# Patient Record
Sex: MALE | Race: BLACK OR AFRICAN AMERICAN | NOT HISPANIC OR LATINO
[De-identification: names, ages, dates, MRNs, and addresses within clinical notes are randomized per-mention and may not be internally consistent; named-entity substitution may affect disease eponyms.]

---

## 2017-06-23 ENCOUNTER — APPOINTMENT (OUTPATIENT)
Dept: ORTHOPEDIC SURGERY | Facility: CLINIC | Age: 47
End: 2017-06-23

## 2017-06-23 VITALS — HEIGHT: 68 IN | WEIGHT: 218 LBS | BODY MASS INDEX: 33.04 KG/M2

## 2017-06-23 DIAGNOSIS — Z78.9 OTHER SPECIFIED HEALTH STATUS: ICD-10-CM

## 2017-06-23 DIAGNOSIS — M67.911 UNSPECIFIED DISORDER OF SYNOVIUM AND TENDON, RIGHT SHOULDER: ICD-10-CM

## 2017-07-12 ENCOUNTER — APPOINTMENT (OUTPATIENT)
Dept: ORTHOPEDIC SURGERY | Facility: CLINIC | Age: 47
End: 2017-07-12

## 2017-08-08 ENCOUNTER — APPOINTMENT (OUTPATIENT)
Dept: HEART AND VASCULAR | Facility: CLINIC | Age: 47
End: 2017-08-08

## 2017-08-08 VITALS
OXYGEN SATURATION: 98 % | TEMPERATURE: 98.6 F | BODY MASS INDEX: 32.89 KG/M2 | DIASTOLIC BLOOD PRESSURE: 86 MMHG | HEART RATE: 61 BPM | RESPIRATION RATE: 12 BRPM | HEIGHT: 68 IN | WEIGHT: 217 LBS | SYSTOLIC BLOOD PRESSURE: 120 MMHG

## 2017-08-10 ENCOUNTER — APPOINTMENT (OUTPATIENT)
Dept: SURGERY | Facility: CLINIC | Age: 47
End: 2017-08-10

## 2017-08-10 ENCOUNTER — OUTPATIENT (OUTPATIENT)
Dept: OUTPATIENT SERVICES | Facility: HOSPITAL | Age: 47
LOS: 1 days | End: 2017-08-10
Payer: COMMERCIAL

## 2017-08-10 DIAGNOSIS — Z01.818 ENCOUNTER FOR OTHER PREPROCEDURAL EXAMINATION: ICD-10-CM

## 2017-08-10 DIAGNOSIS — M75.121 COMPLETE ROTATOR CUFF TEAR OR RUPTURE OF RIGHT SHOULDER, NOT SPECIFIED AS TRAUMATIC: ICD-10-CM

## 2017-08-10 DIAGNOSIS — M67.921 UNSPECIFIED DISORDER OF SYNOVIUM AND TENDON, RIGHT UPPER ARM: ICD-10-CM

## 2017-08-10 LAB
ANION GAP SERPL CALC-SCNC: 12 MMOL/L — SIGNIFICANT CHANGE UP (ref 5–17)
APTT BLD: 31.9 SEC — SIGNIFICANT CHANGE UP (ref 27.5–37.4)
BUN SERPL-MCNC: 15 MG/DL — SIGNIFICANT CHANGE UP (ref 7–23)
CALCIUM SERPL-MCNC: 9.2 MG/DL — SIGNIFICANT CHANGE UP (ref 8.4–10.5)
CHLORIDE SERPL-SCNC: 98 MMOL/L — SIGNIFICANT CHANGE UP (ref 96–108)
CO2 SERPL-SCNC: 25 MMOL/L — SIGNIFICANT CHANGE UP (ref 22–31)
CREAT SERPL-MCNC: 1.2 MG/DL — SIGNIFICANT CHANGE UP (ref 0.5–1.3)
GLUCOSE SERPL-MCNC: 88 MG/DL — SIGNIFICANT CHANGE UP (ref 70–99)
HCT VFR BLD CALC: 41.9 % — SIGNIFICANT CHANGE UP (ref 39–50)
HGB BLD-MCNC: 14.7 G/DL — SIGNIFICANT CHANGE UP (ref 13–17)
INR BLD: 1.03 — SIGNIFICANT CHANGE UP (ref 0.88–1.16)
MCHC RBC-ENTMCNC: 30.8 PG — SIGNIFICANT CHANGE UP (ref 27–34)
MCHC RBC-ENTMCNC: 35.1 G/DL — SIGNIFICANT CHANGE UP (ref 32–36)
MCV RBC AUTO: 87.7 FL — SIGNIFICANT CHANGE UP (ref 80–100)
PLATELET # BLD AUTO: 262 K/UL — SIGNIFICANT CHANGE UP (ref 150–400)
POTASSIUM SERPL-MCNC: 4 MMOL/L — SIGNIFICANT CHANGE UP (ref 3.5–5.3)
POTASSIUM SERPL-SCNC: 4 MMOL/L — SIGNIFICANT CHANGE UP (ref 3.5–5.3)
PROTHROM AB SERPL-ACNC: 11.4 SEC — SIGNIFICANT CHANGE UP (ref 9.8–12.7)
RBC # BLD: 4.78 M/UL — SIGNIFICANT CHANGE UP (ref 4.2–5.8)
RBC # FLD: 12.5 % — SIGNIFICANT CHANGE UP (ref 10.3–16.9)
SODIUM SERPL-SCNC: 135 MMOL/L — SIGNIFICANT CHANGE UP (ref 135–145)
WBC # BLD: 5.3 K/UL — SIGNIFICANT CHANGE UP (ref 3.8–10.5)
WBC # FLD AUTO: 5.3 K/UL — SIGNIFICANT CHANGE UP (ref 3.8–10.5)

## 2017-08-10 PROCEDURE — 93010 ELECTROCARDIOGRAM REPORT: CPT

## 2017-08-17 ENCOUNTER — APPOINTMENT (OUTPATIENT)
Dept: ORTHOPEDIC SURGERY | Facility: AMBULATORY SURGERY CENTER | Age: 47
End: 2017-08-17

## 2017-08-17 ENCOUNTER — OUTPATIENT (OUTPATIENT)
Dept: OUTPATIENT SERVICES | Facility: HOSPITAL | Age: 47
LOS: 1 days | Discharge: HOME | End: 2017-08-17
Payer: COMMERCIAL

## 2017-08-17 PROCEDURE — 29827 SHO ARTHRS SRG RT8TR CUF RPR: CPT | Mod: RT
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
## 2017-08-25 DIAGNOSIS — E66.9 OBESITY, UNSPECIFIED: ICD-10-CM

## 2017-08-25 DIAGNOSIS — Z87.442 PERSONAL HISTORY OF URINARY CALCULI: ICD-10-CM

## 2017-08-25 DIAGNOSIS — S46.011A STRAIN OF MUSCLE(S) AND TENDON(S) OF THE ROTATOR CUFF OF RIGHT SHOULDER, INITIAL ENCOUNTER: ICD-10-CM
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
## 2017-08-25 DIAGNOSIS — Z87.891 PERSONAL HISTORY OF NICOTINE DEPENDENCE: ICD-10-CM

## 2017-08-29 ENCOUNTER — APPOINTMENT (OUTPATIENT)
Dept: ORTHOPEDIC SURGERY | Facility: CLINIC | Age: 47
End: 2017-08-29
Payer: COMMERCIAL

## 2017-08-29 PROCEDURE — 99024 POSTOP FOLLOW-UP VISIT: CPT

## 2017-09-18 ENCOUNTER — APPOINTMENT (OUTPATIENT)
Dept: ORTHOPEDIC SURGERY | Facility: CLINIC | Age: 47
End: 2017-09-18
Payer: COMMERCIAL

## 2017-09-18 PROCEDURE — 99024 POSTOP FOLLOW-UP VISIT: CPT

## 2017-10-25 ENCOUNTER — APPOINTMENT (OUTPATIENT)
Dept: ORTHOPEDIC SURGERY | Facility: CLINIC | Age: 47
End: 2017-10-25

## 2017-11-15 ENCOUNTER — APPOINTMENT (OUTPATIENT)
Dept: ORTHOPEDIC SURGERY | Facility: CLINIC | Age: 47
End: 2017-11-15
Payer: COMMERCIAL

## 2017-11-15 PROCEDURE — 99024 POSTOP FOLLOW-UP VISIT: CPT

## 2017-12-20 ENCOUNTER — APPOINTMENT (OUTPATIENT)
Dept: ORTHOPEDIC SURGERY | Facility: CLINIC | Age: 47
End: 2017-12-20
Payer: COMMERCIAL

## 2017-12-20 PROCEDURE — 99214 OFFICE O/P EST MOD 30 MIN: CPT

## 2018-01-08 ENCOUNTER — APPOINTMENT (OUTPATIENT)
Dept: ORTHOPEDIC SURGERY | Facility: CLINIC | Age: 48
End: 2018-01-08
Payer: COMMERCIAL

## 2018-01-08 PROCEDURE — 99214 OFFICE O/P EST MOD 30 MIN: CPT

## 2018-04-02 ENCOUNTER — APPOINTMENT (OUTPATIENT)
Dept: ORTHOPEDIC SURGERY | Facility: CLINIC | Age: 48
End: 2018-04-02
Payer: COMMERCIAL

## 2018-04-02 PROCEDURE — 99213 OFFICE O/P EST LOW 20 MIN: CPT

## 2018-07-24 ENCOUNTER — EMERGENCY (EMERGENCY)
Facility: HOSPITAL | Age: 48
LOS: 1 days | Discharge: HOME | End: 2018-07-24
Attending: EMERGENCY MEDICINE | Admitting: INTERNAL MEDICINE
Payer: COMMERCIAL

## 2018-07-24 VITALS
OXYGEN SATURATION: 96 % | TEMPERATURE: 98 F | DIASTOLIC BLOOD PRESSURE: 91 MMHG | HEART RATE: 66 BPM | SYSTOLIC BLOOD PRESSURE: 150 MMHG | RESPIRATION RATE: 20 BRPM

## 2018-07-24 VITALS
TEMPERATURE: 98 F | HEIGHT: 68 IN | OXYGEN SATURATION: 98 % | DIASTOLIC BLOOD PRESSURE: 89 MMHG | HEART RATE: 64 BPM | SYSTOLIC BLOOD PRESSURE: 140 MMHG | WEIGHT: 226.19 LBS | RESPIRATION RATE: 20 BRPM

## 2018-07-24 DIAGNOSIS — R42 DIZZINESS AND GIDDINESS: ICD-10-CM

## 2018-07-24 DIAGNOSIS — R50.9 FEVER, UNSPECIFIED: ICD-10-CM

## 2018-07-24 PROCEDURE — 70450 CT HEAD/BRAIN W/O DYE: CPT | Mod: 26

## 2018-07-24 PROCEDURE — 36415 COLL VENOUS BLD VENIPUNCTURE: CPT

## 2018-07-24 PROCEDURE — 99284 EMERGENCY DEPT VISIT MOD MDM: CPT | Mod: 25

## 2018-07-24 PROCEDURE — 85730 THROMBOPLASTIN TIME PARTIAL: CPT

## 2018-07-24 PROCEDURE — 82962 GLUCOSE BLOOD TEST: CPT

## 2018-07-24 PROCEDURE — 93010 ELECTROCARDIOGRAM REPORT: CPT

## 2018-07-24 PROCEDURE — 70450 CT HEAD/BRAIN W/O DYE: CPT

## 2018-07-24 PROCEDURE — 80053 COMPREHEN METABOLIC PANEL: CPT

## 2018-07-24 PROCEDURE — 85025 COMPLETE CBC W/AUTO DIFF WBC: CPT

## 2018-07-24 PROCEDURE — 85610 PROTHROMBIN TIME: CPT

## 2018-07-24 PROCEDURE — 93005 ELECTROCARDIOGRAM TRACING: CPT

## 2018-07-24 RX ORDER — DIAZEPAM 5 MG
1 TABLET ORAL
Qty: 15 | Refills: 0 | OUTPATIENT
Start: 2018-07-24

## 2018-07-24 RX ORDER — SODIUM CHLORIDE 9 MG/ML
1000 INJECTION INTRAMUSCULAR; INTRAVENOUS; SUBCUTANEOUS ONCE
Qty: 0 | Refills: 0 | Status: COMPLETED | OUTPATIENT
Start: 2018-07-24 | End: 2018-07-24

## 2018-07-24 RX ORDER — MECLIZINE HCL 12.5 MG
25 TABLET ORAL ONCE
Qty: 0 | Refills: 0 | Status: COMPLETED | OUTPATIENT
Start: 2018-07-24 | End: 2018-07-24

## 2018-07-24 RX ORDER — DIAZEPAM 5 MG
5 TABLET ORAL ONCE
Qty: 0 | Refills: 0 | Status: DISCONTINUED | OUTPATIENT
Start: 2018-07-24 | End: 2018-07-24

## 2018-07-24 RX ORDER — MECLIZINE HCL 12.5 MG
1 TABLET ORAL
Qty: 20 | Refills: 0 | OUTPATIENT
Start: 2018-07-24

## 2018-07-24 RX ADMIN — Medication 5 MILLIGRAM(S): at 15:10

## 2018-07-24 RX ADMIN — Medication 25 MILLIGRAM(S): at 15:10

## 2018-07-24 RX ADMIN — SODIUM CHLORIDE 2000 MILLILITER(S): 9 INJECTION INTRAMUSCULAR; INTRAVENOUS; SUBCUTANEOUS at 15:11

## 2018-07-24 NOTE — ED PROVIDER NOTE - CARE PLAN
Principal Discharge DX:	Pyelonephritis  Secondary Diagnosis:	Fever Principal Discharge DX:	Vertigo  Secondary Diagnosis:	Fever Principal Discharge DX:	Vertigo

## 2018-07-24 NOTE — ED PROVIDER NOTE - MEDICAL DECISION MAKING DETAILS
47 yo male, with hx of vertigo (dxed several years ago), p/w dizziness and sensation of "room spinning" and unsteady gait, worsening since yesterday morning. Mild diffuse HA earlier, which has now resolved. Sxs are similar to prior episode of vertigo except sxs have lasted a lot longer. Pt took some OTC "motion sickness" medication yesterday (unknown name) with no improvement. States sxs worsen everytime he moves his head. Went to an Urgent Care today and was sent to ED. Pt denies focal weakness/ paresthesias/ speech changes/ vision changes/Cp/SOB/ recent illness/ fevers/ chills/N/V. Pt feeling much better post IVF, Meclizine and Valium. Resting comfortably. Ambulating normally. No ataxia. Rxs given. To f/up outpt. ENT f/up given.

## 2018-07-24 NOTE — ED ADULT NURSE NOTE - OBJECTIVE STATEMENT
Pt came to ED complaining of dizziness and "room spinning" x 30 hours. Pt reports vertigo in past.  PT denies D/N/V, fever, chills, chest pain or SOB.  Pt states he woke up with symptoms yesterday, took OTC medication with no relief. Pt is alert and oriented x3.  Denies all other medical complaints at this time. Positive PMS x4 extremities, no facial droop, neg results on [employer] Stroke scale.

## 2018-07-24 NOTE — ED ADULT NURSE NOTE - CHPI ED SYMPTOMS NEG
no nausea/no chills/no pain/no numbness/no tingling/no fever/no weakness/no decreased eating/drinking

## 2018-07-24 NOTE — ED PROVIDER NOTE - OBJECTIVE STATEMENT
47 yo male, with hx of vertigo (dxed several years ago), p/w dizziness and sensation of "room spinning" and unsteady gait, worsening since yesterday morning. Mild diffuse HA earlier, which has now resolved. Sxs are similar to prior episode of vertigo except sxs have lasted a lot longer. Pt took some OTC "motion sickness" medication yesterday (unknown name) with no improvement. States sxs worsen everytime he moves his head. Went to an Urgent Care today and was sent to ED. Pt denies focal weakness/ paresthesias/ speech changes/ vision changes/Cp/SOB/ rcent illness/ fevers/ chills/N/V. No other complaints.

## 2018-07-24 NOTE — ED PROVIDER NOTE - PROGRESS NOTE DETAILS
post IVF, Meclizine and Valium. Resting comfortably. Ambulating normally. No ataxia. Rxs given. To f/up outpt. ENT f/up given.

## 2018-07-24 NOTE — ED ADULT TRIAGE NOTE - CHIEF COMPLAINT QUOTE
Patient c/o dizziness ( feels room spinning and unsteady ) , lightheadedness and headache since 5am  yesterday . Was sent from urgent care for evaluation .

## 2018-10-25 ENCOUNTER — EMERGENCY (EMERGENCY)
Facility: HOSPITAL | Age: 48
LOS: 1 days | Discharge: HOME | End: 2018-10-25
Attending: EMERGENCY MEDICINE | Admitting: EMERGENCY MEDICINE
Payer: COMMERCIAL

## 2018-10-25 VITALS
RESPIRATION RATE: 18 BRPM | HEART RATE: 51 BPM | OXYGEN SATURATION: 98 % | DIASTOLIC BLOOD PRESSURE: 86 MMHG | TEMPERATURE: 99 F | SYSTOLIC BLOOD PRESSURE: 136 MMHG

## 2018-10-25 VITALS
WEIGHT: 225.09 LBS | RESPIRATION RATE: 17 BRPM | SYSTOLIC BLOOD PRESSURE: 154 MMHG | DIASTOLIC BLOOD PRESSURE: 94 MMHG | HEART RATE: 66 BPM | TEMPERATURE: 98 F

## 2018-10-25 DIAGNOSIS — I10 ESSENTIAL (PRIMARY) HYPERTENSION: ICD-10-CM

## 2018-10-25 DIAGNOSIS — R42 DIZZINESS AND GIDDINESS: ICD-10-CM

## 2018-10-25 DIAGNOSIS — Z79.899 OTHER LONG TERM (CURRENT) DRUG THERAPY: ICD-10-CM

## 2018-10-25 LAB
ALBUMIN SERPL ELPH-MCNC: 4.3 G/DL — SIGNIFICANT CHANGE UP (ref 3.3–5)
ALP SERPL-CCNC: 62 U/L — SIGNIFICANT CHANGE UP (ref 40–120)
ALT FLD-CCNC: SIGNIFICANT CHANGE UP U/L (ref 10–45)
ANION GAP SERPL CALC-SCNC: 12 MMOL/L — SIGNIFICANT CHANGE UP (ref 5–17)
AST SERPL-CCNC: SIGNIFICANT CHANGE UP U/L (ref 10–40)
BASOPHILS NFR BLD AUTO: 0.5 % — SIGNIFICANT CHANGE UP (ref 0–2)
BILIRUB SERPL-MCNC: 0.3 MG/DL — SIGNIFICANT CHANGE UP (ref 0.2–1.2)
BUN SERPL-MCNC: 11 MG/DL — SIGNIFICANT CHANGE UP (ref 7–23)
CALCIUM SERPL-MCNC: 9.4 MG/DL — SIGNIFICANT CHANGE UP (ref 8.4–10.5)
CHLORIDE SERPL-SCNC: 100 MMOL/L — SIGNIFICANT CHANGE UP (ref 96–108)
CK MB CFR SERPL CALC: 3.9 NG/ML — SIGNIFICANT CHANGE UP (ref 0–6.7)
CK SERPL-CCNC: SIGNIFICANT CHANGE UP U/L (ref 30–200)
CO2 SERPL-SCNC: 27 MMOL/L — SIGNIFICANT CHANGE UP (ref 22–31)
CREAT SERPL-MCNC: 1.09 MG/DL — SIGNIFICANT CHANGE UP (ref 0.5–1.3)
EOSINOPHIL NFR BLD AUTO: 1.6 % — SIGNIFICANT CHANGE UP (ref 0–6)
GLUCOSE SERPL-MCNC: 111 MG/DL — HIGH (ref 70–99)
HCT VFR BLD CALC: 41.9 % — SIGNIFICANT CHANGE UP (ref 39–50)
HGB BLD-MCNC: 14.7 G/DL — SIGNIFICANT CHANGE UP (ref 13–17)
LYMPHOCYTES # BLD AUTO: 52.3 % — HIGH (ref 13–44)
MCHC RBC-ENTMCNC: 30.7 PG — SIGNIFICANT CHANGE UP (ref 27–34)
MCHC RBC-ENTMCNC: 35.1 G/DL — SIGNIFICANT CHANGE UP (ref 32–36)
MCV RBC AUTO: 87.5 FL — SIGNIFICANT CHANGE UP (ref 80–100)
MONOCYTES NFR BLD AUTO: 8.6 % — SIGNIFICANT CHANGE UP (ref 2–14)
NEUTROPHILS NFR BLD AUTO: 37 % — LOW (ref 43–77)
PLATELET # BLD AUTO: 255 K/UL — SIGNIFICANT CHANGE UP (ref 150–400)
POTASSIUM SERPL-MCNC: SIGNIFICANT CHANGE UP MMOL/L (ref 3.5–5.3)
POTASSIUM SERPL-SCNC: SIGNIFICANT CHANGE UP MMOL/L (ref 3.5–5.3)
PROT SERPL-MCNC: 7.3 G/DL — SIGNIFICANT CHANGE UP (ref 6–8.3)
RBC # BLD: 4.79 M/UL — SIGNIFICANT CHANGE UP (ref 4.2–5.8)
RBC # FLD: 12.1 % — SIGNIFICANT CHANGE UP (ref 10.3–16.9)
SODIUM SERPL-SCNC: 139 MMOL/L — SIGNIFICANT CHANGE UP (ref 135–145)
TROPONIN T SERPL-MCNC: <0.01 NG/ML — SIGNIFICANT CHANGE UP (ref 0–0.01)
WBC # BLD: 4.4 K/UL — SIGNIFICANT CHANGE UP (ref 3.8–10.5)
WBC # FLD AUTO: 4.4 K/UL — SIGNIFICANT CHANGE UP (ref 3.8–10.5)

## 2018-10-25 PROCEDURE — 99284 EMERGENCY DEPT VISIT MOD MDM: CPT

## 2018-10-25 PROCEDURE — 70450 CT HEAD/BRAIN W/O DYE: CPT | Mod: 26

## 2018-10-25 PROCEDURE — 82553 CREATINE MB FRACTION: CPT

## 2018-10-25 PROCEDURE — 80053 COMPREHEN METABOLIC PANEL: CPT

## 2018-10-25 PROCEDURE — 84484 ASSAY OF TROPONIN QUANT: CPT

## 2018-10-25 PROCEDURE — 36415 COLL VENOUS BLD VENIPUNCTURE: CPT

## 2018-10-25 PROCEDURE — 85025 COMPLETE CBC W/AUTO DIFF WBC: CPT

## 2018-10-25 PROCEDURE — 82550 ASSAY OF CK (CPK): CPT

## 2018-10-25 PROCEDURE — 99284 EMERGENCY DEPT VISIT MOD MDM: CPT | Mod: 25

## 2018-10-25 PROCEDURE — 70450 CT HEAD/BRAIN W/O DYE: CPT

## 2018-10-25 RX ORDER — AMLODIPINE BESYLATE 2.5 MG/1
5 TABLET ORAL ONCE
Qty: 0 | Refills: 0 | Status: COMPLETED | OUTPATIENT
Start: 2018-10-25 | End: 2018-10-25

## 2018-10-25 RX ORDER — AMLODIPINE BESYLATE 2.5 MG/1
1 TABLET ORAL
Qty: 20 | Refills: 0 | OUTPATIENT
Start: 2018-10-25 | End: 2018-11-13

## 2018-10-25 RX ADMIN — AMLODIPINE BESYLATE 5 MILLIGRAM(S): 2.5 TABLET ORAL at 18:12

## 2018-10-25 NOTE — ED PROVIDER NOTE - OBJECTIVE STATEMENT
48 m w dizziness 48 m w dizziness- was at another facility- had blood work done a few weeks ago was neg- was dx w vertigo and given meclizine and valium  sx improve slightly with medication  no f/c no ha no n/v  sx of mild dizziness, awais when he moves his head  no falls

## 2018-10-25 NOTE — ED ADULT NURSE NOTE - NSIMPLEMENTINTERV_GEN_ALL_ED
Implemented All Universal Safety Interventions:  [place] to call system. Call bell, personal items and telephone within reach. Instruct patient to call for assistance. Room bathroom lighting operational. Non-slip footwear when patient is off stretcher. Physically safe environment: no spills, clutter or unnecessary equipment. Stretcher in lowest position, wheels locked, appropriate side rails in place.

## 2018-10-25 NOTE — ED ADULT NURSE REASSESSMENT NOTE - NS ED NURSE REASSESS COMMENT FT1
Patient US and XRay done, no new pain or symptom complaint, vital signs stable. Seen by Vascular team.  REsults and disposition pending. In CT scan, ambulated w/ steady gait.

## 2018-10-25 NOTE — ED ADULT NURSE REASSESSMENT NOTE - NS ED NURSE REASSESS COMMENT FT1
Patient a/oX 3, states dizziness resolved s/p Norvasc 5mg PO, BP improved, other vitals stable.  CT scan resulted, all labs resulted, discharged to home in stable condition.

## 2018-10-25 NOTE — ED ADULT TRIAGE NOTE - CHIEF COMPLAINT QUOTE
c/o lightheadedness when he got up in the morning 3 days ago, headache and high blood pressure.  Patient was told he has vertigo a few years ago.  Speaking clearly and coherently in full sentences.

## 2018-10-25 NOTE — ED ADULT NURSE NOTE - OBJECTIVE STATEMENT
Patient states has Hx. of vertigo and HTN, c/o of days of dizziness/lightheadedness, no headache or ringing in ears, no nausea/vomitting, denies chest pain, no SOB.  EKG NSR in ED.  No neuro deficits.

## 2018-10-25 NOTE — ED ADULT NURSE REASSESSMENT NOTE - NS ED NURSE REASSESS COMMENT FT1
Patient a/oX 3, anxious, c/o of episodes of dizziness, no chest pain or SOB, no nausea/vomitting, no neuro deficits.  Elevated BP, other vitals stable.  Right AC PIV #20 in place, all labs sent, no complications.  AMlodipine 5mg PO adminsitered.  Brought to CT scan in stable condition.

## 2018-10-25 NOTE — ED ADULT NURSE NOTE - CHPI ED NUR SYMPTOMS NEG
no decreased eating/drinking/no tingling/no fever/no chills/no vomiting/no nausea/no weakness/no pain

## 2018-11-30 ENCOUNTER — APPOINTMENT (OUTPATIENT)
Dept: HEART AND VASCULAR | Facility: CLINIC | Age: 48
End: 2018-11-30
Payer: COMMERCIAL

## 2018-11-30 VITALS
OXYGEN SATURATION: 97 % | DIASTOLIC BLOOD PRESSURE: 70 MMHG | WEIGHT: 220 LBS | SYSTOLIC BLOOD PRESSURE: 122 MMHG | HEIGHT: 68 IN | HEART RATE: 73 BPM | TEMPERATURE: 99 F | BODY MASS INDEX: 33.34 KG/M2

## 2018-11-30 DIAGNOSIS — H40.9 UNSPECIFIED GLAUCOMA: ICD-10-CM
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
## 2018-11-30 DIAGNOSIS — Z87.442 PERSONAL HISTORY OF URINARY CALCULI: ICD-10-CM

## 2018-11-30 PROCEDURE — 93000 ELECTROCARDIOGRAM COMPLETE: CPT

## 2018-11-30 PROCEDURE — 99204 OFFICE O/P NEW MOD 45 MIN: CPT

## 2018-11-30 PROCEDURE — 36415 COLL VENOUS BLD VENIPUNCTURE: CPT

## 2018-11-30 NOTE — ASSESSMENT
[FreeTextEntry1] : Health Maintainence- needs colonoscopy, PSA and GUTIERREZ 11/30/18\par \par HTN-  BP good on Amlodipine\par \par Elevated Glucose- diet reviewed, A1c sent\par \par Vertigo- CT head (-) in the ER\par \par BPH- could not reach entire gland, PSA sent.  Pt to look up name of Urologist

## 2018-11-30 NOTE — REASON FOR VISIT
[FreeTextEntry1] : 47 y/o with HTN, dizziness glucose 110.\par Seen in ER Oct 25, 2018.  BP up, CT head (-). Sugar mildly elevated in ER, diet poor\par \par EKG: NSR, normal axis and intervals, no ST-Tw abnormalities. 11/30/18

## 2018-12-02 LAB
APPEARANCE: CLEAR
BACTERIA: NEGATIVE
BILIRUBIN URINE: NEGATIVE
BLOOD URINE: ABNORMAL
CHOLEST SERPL-MCNC: 229 MG/DL
CHOLEST/HDLC SERPL: 5.5 RATIO
COLOR: YELLOW
GLUCOSE QUALITATIVE U: NEGATIVE MG/DL
HBA1C MFR BLD HPLC: 5.3 %
HDLC SERPL-MCNC: 42 MG/DL
HYALINE CASTS: 1 /LPF
KETONES URINE: NEGATIVE
LDLC SERPL CALC-MCNC: 162 MG/DL
LEUKOCYTE ESTERASE URINE: NEGATIVE
MICROSCOPIC-UA: NORMAL
NITRITE URINE: NEGATIVE
PH URINE: 5.5
PROTEIN URINE: NEGATIVE MG/DL
PSA SERPL-MCNC: 0.51 NG/ML
RED BLOOD CELLS URINE: 16 /HPF
SPECIFIC GRAVITY URINE: 1.03
SQUAMOUS EPITHELIAL CELLS: 1 /HPF
TRIGL SERPL-MCNC: 125 MG/DL
UROBILINOGEN URINE: NEGATIVE MG/DL
WHITE BLOOD CELLS URINE: 1 /HPF

## 2018-12-07 ENCOUNTER — CHART COPY (OUTPATIENT)
Age: 48
End: 2018-12-07

## 2018-12-12 ENCOUNTER — APPOINTMENT (OUTPATIENT)
Dept: ORTHOPEDIC SURGERY | Facility: CLINIC | Age: 48
End: 2018-12-12
Payer: COMMERCIAL

## 2018-12-12 PROCEDURE — 73030 X-RAY EXAM OF SHOULDER: CPT | Mod: 50

## 2018-12-12 PROCEDURE — 99214 OFFICE O/P EST MOD 30 MIN: CPT

## 2019-02-13 ENCOUNTER — APPOINTMENT (OUTPATIENT)
Dept: HEART AND VASCULAR | Facility: CLINIC | Age: 49
End: 2019-02-13
Payer: COMMERCIAL

## 2019-02-13 VITALS
OXYGEN SATURATION: 97 % | HEIGHT: 68 IN | DIASTOLIC BLOOD PRESSURE: 80 MMHG | TEMPERATURE: 98.7 F | WEIGHT: 218.99 LBS | BODY MASS INDEX: 33.19 KG/M2 | SYSTOLIC BLOOD PRESSURE: 132 MMHG | HEART RATE: 67 BPM

## 2019-02-13 DIAGNOSIS — Z82.49 FAMILY HISTORY OF ISCHEMIC HEART DISEASE AND OTHER DISEASES OF THE CIRCULATORY SYSTEM: ICD-10-CM

## 2019-02-13 PROCEDURE — 99214 OFFICE O/P EST MOD 30 MIN: CPT

## 2019-02-13 NOTE — PHYSICAL EXAM
[General Appearance - Well Developed] : well developed [Normal Appearance] : normal appearance [Well Groomed] : well groomed [General Appearance - Well Nourished] : well nourished [No Deformities] : no deformities [General Appearance - In No Acute Distress] : no acute distress [Normal Conjunctiva] : the conjunctiva exhibited no abnormalities [Eyelids - No Xanthelasma] : the eyelids demonstrated no xanthelasmas [Respiration, Rhythm And Depth] : normal respiratory rhythm and effort [Exaggerated Use Of Accessory Muscles For Inspiration] : no accessory muscle use [Auscultation Breath Sounds / Voice Sounds] : lungs were clear to auscultation bilaterally [Heart Rate And Rhythm] : heart rate and rhythm were normal [Heart Sounds] : normal S1 and S2 [Murmurs] : no murmurs present [Abdomen Soft] : soft [Abdomen Tenderness] : non-tender [Abdomen Mass (___ Cm)] : no abdominal mass palpated [Abnormal Walk] : normal gait [Gait - Sufficient For Exercise Testing] : the gait was sufficient for exercise testing [Nail Clubbing] : no clubbing of the fingernails [Cyanosis, Localized] : no localized cyanosis [Petechial Hemorrhages (___cm)] : no petechial hemorrhages [Skin Color & Pigmentation] : normal skin color and pigmentation [] : no rash [No Venous Stasis] : no venous stasis [No Skin Ulcers] : no skin ulcer [No Xanthoma] : no  xanthoma was observed [Oriented To Time, Place, And Person] : oriented to person, place, and time [Affect] : the affect was normal [Mood] : the mood was normal [No Anxiety] : not feeling anxious [FreeTextEntry1] : old burns

## 2019-02-13 NOTE — ASSESSMENT
[FreeTextEntry1] : Health Maintainence- Colonoscopy done by Dr Chanel 1/2019, PSA and GUTIERREZ 11/30/18, PSA NL but microscopic hematuria, refer to Dr Wren\par \par HTN-  BP good on Amlodipine\par \par Elevated Glucose- diet reviewed, A1c sent, 5.5\par \par HLD- , diet, wt loss reviewed\par \par Vertigo- CT head (-) in the ER\par \par BPH- could not reach entire gland, PSA sent.  Urologist is Dr Wren\par \par Health Maintainence- Colonoscopy 1/2019 with Dr Chanel, PSA and GUTIERREZ 2018, needs Urology visit

## 2019-02-13 NOTE — REASON FOR VISIT
[FreeTextEntry1] : 49 y/o with HTN, dizziness glucose 110.\par Seen in ER Oct 25, 2018.  BP up, CT head (-). Sugar mildly elevated in ER, diet poor\par \par EKG: NSR, normal axis and intervals, no ST-Tw abnormalities. 11/30/18\par \par Labs reveal  and microscopic hematuria

## 2019-04-30 ENCOUNTER — APPOINTMENT (OUTPATIENT)
Dept: ORTHOPEDIC SURGERY | Facility: CLINIC | Age: 49
End: 2019-04-30
Payer: COMMERCIAL

## 2019-04-30 VITALS
HEIGHT: 68 IN | HEART RATE: 67 BPM | BODY MASS INDEX: 33.04 KG/M2 | WEIGHT: 218 LBS | OXYGEN SATURATION: 98 % | SYSTOLIC BLOOD PRESSURE: 133 MMHG | DIASTOLIC BLOOD PRESSURE: 91 MMHG

## 2019-04-30 DIAGNOSIS — M25.512 PAIN IN LEFT SHOULDER: ICD-10-CM

## 2019-04-30 DIAGNOSIS — Z98.890 OTHER SPECIFIED POSTPROCEDURAL STATES: ICD-10-CM

## 2019-04-30 DIAGNOSIS — M25.611 STIFFNESS OF RIGHT SHOULDER, NOT ELSEWHERE CLASSIFIED: ICD-10-CM

## 2019-04-30 DIAGNOSIS — M67.921 UNSPECIFIED DISORDER OF SYNOVIUM AND TENDON, RIGHT UPPER ARM: ICD-10-CM

## 2019-04-30 DIAGNOSIS — M75.22 BICIPITAL TENDINITIS, LEFT SHOULDER: ICD-10-CM

## 2019-04-30 DIAGNOSIS — M75.121 COMPLETE ROTATOR CUFF TEAR OR RUPTURE OF RIGHT SHOULDER, NOT SPECIFIED AS TRAUMATIC: ICD-10-CM

## 2019-04-30 PROCEDURE — 99214 OFFICE O/P EST MOD 30 MIN: CPT

## 2019-04-30 RX ORDER — DIAZEPAM 5 MG/1
5 TABLET ORAL
Refills: 0 | Status: COMPLETED | COMMUNITY
End: 2019-04-30

## 2019-04-30 RX ORDER — OXYCODONE HYDROCHLORIDE AND ACETAMINOPHEN 10; 325 MG/1; MG/1
10-325 TABLET ORAL
Refills: 0 | Status: COMPLETED | COMMUNITY
End: 2019-04-30

## 2019-04-30 RX ORDER — OXYCODONE HYDROCHLORIDE AND ACETAMINOPHEN 10; 325 MG/1; MG/1
TABLET ORAL
Refills: 0 | Status: COMPLETED | COMMUNITY
End: 2019-04-30

## 2019-04-30 NOTE — PHYSICAL EXAM
[UE] : Sensory: Intact in bilateral upper extremities [Rad] : radial 2+ and symmetric bilaterally [Normal RUE] : Right Upper Extremity: No scars, rashes, lesions, ulcers, skin intact [Normal LUE] : Left Upper Extremity: No scars, rashes, lesions, ulcers, skin intact [Normal Touch] : sensation intact for touch [Normal] : Oriented to person, place, and time, insight and judgement were intact and the affect was normal [de-identified] : Bilateral shoulders\par Cervical spine is without tenderness and ROM is within normal limits and without pain.\par Normal appearance. \par Well healed portal incisions over the shoulders. No edema, erythema, ecchymoses\par AROM: 175 FE, IR to T 10 LEFT versus T12 on the RIGHT discomfort on the RIGHT equal to the , 180 abd.\par PROM: 180 FE, 45 ER at the side, 90 ER and 45 IR in the 90 degree abducted position.\par Motor:  5/5  supraspinatus,  5/5 ER, 5/5 IR, 5/5 biceps, 5/5 deltoid.  Normal lift off test\par - Neer, mildly+  Vera. -  X-arm.   - [place]'s,  mildly + Speeds.\par Tender mildly over the biceps tendon and the anterior shoulder.  \par Laxity: normal.\par No scapular winging.\par Sensation is intact distally in the UE.\par Skin is intact in the UE. \par Intact Motor distally.\par  [de-identified] : Overweight [de-identified] : no respiratory distress [de-identified] : \par No x-rays were done today. X-rays of the RIGHT shoulder had been unremarkable in December 2018. LEFT shoulder x-rays showed mild glenohumeral joint space narrowing but otherwise unremarkable

## 2019-04-30 NOTE — HISTORY OF PRESENT ILLNESS
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 He had been under the care of Dr. Savage who had done a RIGHT shoulder rotator cuff repair in 2017 and LEFT shoulder in 2014. His shoulder his head gotten stiff and more sore. He started doing physical therapy again in December 2018. He recently found a good place and it is very helpful and he would like to continue doing the therapy. He gets intermittent pain that's 3/10 that can be sharp or achy. It may be aggravated also by sleeping on his side and some pain during the day with lifting. For his work he does not typically have to lift anything over shoulder height. He occasionally takes ibuprofen 800 mg perhaps once a week for pain. No neck pain. When he gets pain is typically in the lateral shoulder region.

## 2019-04-30 NOTE — REASON FOR VISIT
[Initial Visit] : an initial visit for [Shoulder Pain] : shoulder pain [Aftercare Following Surgery] : aftercare following surgery

## 2019-04-30 NOTE — ASSESSMENT
[FreeTextEntry1] : 49-year-old gentleman status post LEFT and RIGHT rotator cuff repair in the past, the RIGHT more recently in 2017 with some stiffness and achiness in his shoulders more on the RIGHT than LEFT. His discomfort is improving with the physical therapy and I gave him a new prescription to continue with therapy now. Heat and ice and ibuprofen as needed.\par if he still is still having any pain, stiffness or issues with his shoulders he should followup in about 2 months.

## 2019-08-14 ENCOUNTER — RX RENEWAL (OUTPATIENT)
Age: 49
End: 2019-08-14

## 2019-08-30 NOTE — ED ADULT TRIAGE NOTE - BANDS:
Patient notified,Nuc Stress Test review per  she is OK for surgery. Patient verbalized understanding.       Fall Risk;

## 2019-09-27 ENCOUNTER — APPOINTMENT (OUTPATIENT)
Dept: HEART AND VASCULAR | Facility: CLINIC | Age: 49
End: 2019-09-27
Payer: COMMERCIAL

## 2019-09-27 VITALS
HEIGHT: 68 IN | BODY MASS INDEX: 33.34 KG/M2 | OXYGEN SATURATION: 98 % | WEIGHT: 220 LBS | SYSTOLIC BLOOD PRESSURE: 148 MMHG | HEART RATE: 81 BPM | DIASTOLIC BLOOD PRESSURE: 88 MMHG

## 2019-09-27 VITALS — DIASTOLIC BLOOD PRESSURE: 86 MMHG | SYSTOLIC BLOOD PRESSURE: 140 MMHG

## 2019-09-27 PROCEDURE — 99214 OFFICE O/P EST MOD 30 MIN: CPT

## 2019-09-27 PROCEDURE — 93000 ELECTROCARDIOGRAM COMPLETE: CPT

## 2019-09-27 NOTE — ASSESSMENT
[FreeTextEntry1] : Health Maintainence- Colonoscopy done by Dr Chanel 1/2019, PSA and GUTIERREZ 11/30/18, PSA NL but microscopic hematuria, refer to Dr Wren.  He did not go. \par \par HTN-  BP good on Amlodipine. Not taking it. Pt to resume, admonished.\par \par Elevated Glucose- diet reviewed, A1c sent, 5.3\par \par HLD- , diet, wt loss reviewed.  Needs to lose weight, labs in 4-6 mos.  Will treat if above 160.\par \par Vertigo- CT head (-) in the ER\par \par BPH- could not reach entire gland, PSA sent.  Urologist is Dr Wren\par \par Health Maintainence- Colonoscopy 1/2019 with Dr Chanel, PSA and GUTIERREZ 2018, needs Urology visit

## 2019-09-27 NOTE — REASON FOR VISIT
[FreeTextEntry1] : 47 y/o with HTN, dizziness glucose 110.\par Seen in ER Oct 25, 2018.  BP up, CT head (-). Sugar mildly elevated in ER, diet poor\par \par EKG: NSR, normal axis and intervals, no ST-Tw abnormalities. 11/30/18\par \par Labs reveal  and microscopic hematuria\par \par 9/27/19- getting spasms in the hands, has left ankle pain. BP up, not taking Norvasc

## 2019-09-27 NOTE — PHYSICAL EXAM
[Normal Appearance] : normal appearance [General Appearance - Well Developed] : well developed [Well Groomed] : well groomed [General Appearance - Well Nourished] : well nourished [General Appearance - In No Acute Distress] : no acute distress [No Deformities] : no deformities [Normal Conjunctiva] : the conjunctiva exhibited no abnormalities [Eyelids - No Xanthelasma] : the eyelids demonstrated no xanthelasmas [Auscultation Breath Sounds / Voice Sounds] : lungs were clear to auscultation bilaterally [Respiration, Rhythm And Depth] : normal respiratory rhythm and effort [Exaggerated Use Of Accessory Muscles For Inspiration] : no accessory muscle use [Heart Sounds] : normal S1 and S2 [Heart Rate And Rhythm] : heart rate and rhythm were normal [Abdomen Soft] : soft [Murmurs] : no murmurs present [Abdomen Tenderness] : non-tender [Abdomen Mass (___ Cm)] : no abdominal mass palpated [Gait - Sufficient For Exercise Testing] : the gait was sufficient for exercise testing [Abnormal Walk] : normal gait [Nail Clubbing] : no clubbing of the fingernails [Cyanosis, Localized] : no localized cyanosis [Petechial Hemorrhages (___cm)] : no petechial hemorrhages [Skin Color & Pigmentation] : normal skin color and pigmentation [] : no rash [No Venous Stasis] : no venous stasis [No Skin Ulcers] : no skin ulcer [No Xanthoma] : no  xanthoma was observed [Oriented To Time, Place, And Person] : oriented to person, place, and time [Mood] : the mood was normal [Affect] : the affect was normal [No Anxiety] : not feeling anxious [FreeTextEntry1] : old burns

## 2019-10-07 DIAGNOSIS — M25.572 PAIN IN LEFT ANKLE AND JOINTS OF LEFT FOOT: ICD-10-CM

## 2019-10-07 DIAGNOSIS — G89.29 PAIN IN LEFT ANKLE AND JOINTS OF LEFT FOOT: ICD-10-CM

## 2019-10-11 ENCOUNTER — APPOINTMENT (OUTPATIENT)
Dept: HEART AND VASCULAR | Facility: CLINIC | Age: 49
End: 2019-10-11
Payer: COMMERCIAL

## 2019-10-11 PROCEDURE — 36415 COLL VENOUS BLD VENIPUNCTURE: CPT

## 2019-10-14 LAB
ALBUMIN SERPL ELPH-MCNC: 4.7 G/DL
ALP BLD-CCNC: 66 U/L
ALT SERPL-CCNC: 30 U/L
ANION GAP SERPL CALC-SCNC: 15 MMOL/L
AST SERPL-CCNC: 25 U/L
BASOPHILS # BLD AUTO: 0.02 K/UL
BASOPHILS NFR BLD AUTO: 0.5 %
BILIRUB SERPL-MCNC: 0.4 MG/DL
BUN SERPL-MCNC: 18 MG/DL
CALCIUM SERPL-MCNC: 9.4 MG/DL
CHLORIDE SERPL-SCNC: 101 MMOL/L
CHOLEST SERPL-MCNC: 201 MG/DL
CHOLEST/HDLC SERPL: 4.9 RATIO
CK SERPL-CCNC: 476 U/L
CO2 SERPL-SCNC: 24 MMOL/L
CREAT SERPL-MCNC: 1.16 MG/DL
EOSINOPHIL # BLD AUTO: 0.07 K/UL
EOSINOPHIL NFR BLD AUTO: 1.7 %
ERYTHROCYTE [SEDIMENTATION RATE] IN BLOOD BY WESTERGREN METHOD: 3 MM/HR
ESTIMATED AVERAGE GLUCOSE: 103 MG/DL
GLUCOSE SERPL-MCNC: 113 MG/DL
HBA1C MFR BLD HPLC: 5.2 %
HCT VFR BLD CALC: 45.6 %
HDLC SERPL-MCNC: 41 MG/DL
HGB BLD-MCNC: 14.9 G/DL
IMM GRANULOCYTES NFR BLD AUTO: 0.2 %
LDLC SERPL CALC-MCNC: 138 MG/DL
LYMPHOCYTES # BLD AUTO: 1.99 K/UL
LYMPHOCYTES NFR BLD AUTO: 46.9 %
MAN DIFF?: NORMAL
MCHC RBC-ENTMCNC: 31.1 PG
MCHC RBC-ENTMCNC: 32.7 GM/DL
MCV RBC AUTO: 95.2 FL
MONOCYTES # BLD AUTO: 0.36 K/UL
MONOCYTES NFR BLD AUTO: 8.5 %
NEUTROPHILS # BLD AUTO: 1.79 K/UL
NEUTROPHILS NFR BLD AUTO: 42.2 %
PLATELET # BLD AUTO: 272 K/UL
POTASSIUM SERPL-SCNC: 4.1 MMOL/L
PROT SERPL-MCNC: 7.5 G/DL
RBC # BLD: 4.79 M/UL
RBC # FLD: 12.2 %
RHEUMATOID FACT SER QL: <10 IU/ML
SODIUM SERPL-SCNC: 140 MMOL/L
TRIGL SERPL-MCNC: 109 MG/DL
URATE SERPL-MCNC: 7.2 MG/DL
WBC # FLD AUTO: 4.24 K/UL

## 2020-02-03 ENCOUNTER — RX RENEWAL (OUTPATIENT)
Age: 50
End: 2020-02-03

## 2020-02-12 ENCOUNTER — APPOINTMENT (OUTPATIENT)
Dept: ORTHOPEDIC SURGERY | Facility: CLINIC | Age: 50
End: 2020-02-12
Payer: OTHER MISCELLANEOUS

## 2020-02-12 VITALS
DIASTOLIC BLOOD PRESSURE: 94 MMHG | HEART RATE: 68 BPM | SYSTOLIC BLOOD PRESSURE: 134 MMHG | OXYGEN SATURATION: 98 % | WEIGHT: 215 LBS | BODY MASS INDEX: 32.58 KG/M2 | HEIGHT: 68 IN

## 2020-02-12 PROCEDURE — 73564 X-RAY EXAM KNEE 4 OR MORE: CPT | Mod: RT

## 2020-02-12 PROCEDURE — 99214 OFFICE O/P EST MOD 30 MIN: CPT

## 2020-02-12 NOTE — REASON FOR VISIT
[Initial Visit] : an initial visit for [Worker's Compensation] : This visit is related to worker's compensation [Spouse] : spouse [Knee Injury] : knee injury

## 2020-02-18 ENCOUNTER — FORM ENCOUNTER (OUTPATIENT)
Age: 50
End: 2020-02-18

## 2020-02-18 NOTE — PHYSICAL EXAM
[LE] : Sensory: Intact in bilateral lower extremities [DP] : dorsalis pedis 2+ and symmetric bilaterally [Normal RLE] : Right Lower Extremity: No scars, rashes, lesions, ulcers, skin intact [PT] : posterior tibial 2+ and symmetric bilaterally [Normal LLE] : Left Lower Extremity: No scars, rashes, lesions, ulcers, skin intact [Normal Touch] : sensation intact for touch [Normal] : Oriented to person, place, and time, insight and judgement were intact and the affect was normal [de-identified] : No respiratory distress [de-identified] : Overweight [de-identified] : RIGHT Knee:\par Antalgic gait but he can walk without a cane and stiff leg\par No significant knee joint effusion.\par There is moderate edema and ecchymoses along the distal medial thigh and medial calf and knee. Significant tenderness around the medial femoral condyle medial to posterior. Tender medial tibia as well proximally.\par There is healing superficial abrasion Longitudinal that is scabbed over and mostly healed.\par ROM: - 5 degrees extension to 85 degrees flexion. Pain with RIGHT knee and range of motion. No crepitus. LEFT knee range of motion is about 0-135° without any pain\par Mild pain with Mann\par Because he has some much tenderness holding his leg it's difficult to get him to relax to do testing but 1A Lachman.  - Pivot shift. - posterior drawer. Normal rotational, varus/valgus laxity.\par Intact extensor mechanism.\par NVI distally.\par  [de-identified] : \par X-rays of the RIGHT knee weightbearing 4 views today show possibly slight periosteal elevation along the medial distal femur metaphysis. No fracture lines are seen. Normal alignment otherwise. No significant osteoarthritis

## 2020-02-18 NOTE — ASSESSMENT
[FreeTextEntry1] : 49-year-old gentleman who had a work injury 10 days ago where his legs fell between the tracts when he was walking on the elevated train area He had a significant contusion to the RIGHT medial knee. There may be bone bruising or occult fracture in the medial femoral condyle or medial tibia given the degree of tenderness. I also want to rule out any other ligamentous injury given the mechanism. I would like an MRI to rule out an occult fracture and until we get the MRI would like him walking partial weightbearing with crutches. He should work on knee range of motion working on full extension and can do straight leg raises to start some rehabilitation. Limit weightbearing activity however.\par He should alternate warm soaks and ice.\par I applied antibiotic ointment to the abrasion which is mostly healed. He can take Tylenol alternating with some ibuprofen as needed for pain.\par There is 100 percent temporary impairment and he cannot work now.\par He should followup in about 2-3 weeks but I will call him as it is I have MRI results. If there is no fracture or significant tears then I will have him start physical therapy. If there is no significant bone bruising her fracture then I can have him progress weightbearing as tolerated.

## 2020-02-18 NOTE — HISTORY OF PRESENT ILLNESS
[de-identified] : Mr. Pandya is a 49-year-old gentleman who works for the [id number] and comes in today for injury to his RIGHT knee which occurred on February 2, 2020. He was walking on the elevated train tracks and his RIGHT leg went through the rail ties. His knee hit hard on this side.\par He had a lot of pain and went to the emergency room. x-rays were taken which were negative.\par He was given a cane. He did get crutches at home that he sees a little bit but mostly uses the cane to walk. He does elevate and ice.\par Pain is about 7/10 now. Pain is worse when he is walking and standing and better resting and lying in bed and icing. Pain is achy and cramping and relatively constant. Pain is along the medial side of his knee and lower thigh area. He hasn't had any other treatment. He's been taking ibuprofen.

## 2020-02-19 ENCOUNTER — OUTPATIENT (OUTPATIENT)
Dept: OUTPATIENT SERVICES | Facility: HOSPITAL | Age: 50
LOS: 1 days | End: 2020-02-19
Payer: OTHER MISCELLANEOUS

## 2020-02-19 ENCOUNTER — APPOINTMENT (OUTPATIENT)
Dept: ULTRASOUND IMAGING | Facility: HOSPITAL | Age: 50
End: 2020-02-19
Payer: OTHER MISCELLANEOUS

## 2020-02-19 PROCEDURE — 93970 EXTREMITY STUDY: CPT

## 2020-02-19 PROCEDURE — 93970 EXTREMITY STUDY: CPT | Mod: 26

## 2020-02-24 ENCOUNTER — FORM ENCOUNTER (OUTPATIENT)
Age: 50
End: 2020-02-24

## 2020-02-25 ENCOUNTER — RESULT REVIEW (OUTPATIENT)
Age: 50
End: 2020-02-25

## 2020-02-25 ENCOUNTER — APPOINTMENT (OUTPATIENT)
Dept: MRI IMAGING | Facility: CLINIC | Age: 50
End: 2020-02-25
Payer: OTHER MISCELLANEOUS

## 2020-02-25 ENCOUNTER — OUTPATIENT (OUTPATIENT)
Dept: OUTPATIENT SERVICES | Facility: HOSPITAL | Age: 50
LOS: 1 days | End: 2020-02-25

## 2020-02-25 PROCEDURE — 73721 MRI JNT OF LWR EXTRE W/O DYE: CPT | Mod: 26,RT

## 2020-02-26 ENCOUNTER — APPOINTMENT (OUTPATIENT)
Dept: ORTHOPEDIC SURGERY | Facility: CLINIC | Age: 50
End: 2020-02-26
Payer: OTHER MISCELLANEOUS

## 2020-02-26 PROCEDURE — 99213 OFFICE O/P EST LOW 20 MIN: CPT

## 2020-02-26 NOTE — PHYSICAL EXAM
[Normal RLE] : Right Lower Extremity: No scars, rashes, lesions, ulcers, skin intact [LE] : Sensory: Intact in bilateral lower extremities [Normal LLE] : Left Lower Extremity: No scars, rashes, lesions, ulcers, skin intact [Normal Touch] : sensation intact for touch [de-identified] : \par We reviewed the MRI images showing him the soft tissue edema medial knee. Normal ligaments, bones, meniscus, tendons [de-identified] : RIGHT Knee/thigh:\par antalgic gait but he can walk full weightbearing\par No effusion.\par There is some mild edema and a more extensive area of ecchymoses. Some of the bruising has moved more distally into the calf as is typical. Swelling has definitely decreased.\par There is an area of firmness and induration over the medial side of his knee where he has the greatest tenderness\par Nontender anterior, lateral or posterior knee\par ROM: 0 degrees extension to 100 degrees flexion- Pain on full flexion. No crepitus\par - Abdiel.\par 1A Lachman.   - posterior drawer. Normal rotational, varus/valgus laxity.\par Intact extensor mechanism.\par NVI distally.\par

## 2020-02-26 NOTE — REASON FOR VISIT
[Follow-Up Visit] : a follow-up visit for [Worker's Compensation] : This visit is related to worker's compensation [Knee Injury] : knee injury

## 2020-02-26 NOTE — ASSESSMENT
[FreeTextEntry1] : 49-year-old with a work-related injury to his RIGHT knee and thigh from a fall through some tracks 3.5 weeks ago at work. His knee is improving. THe area of swelling and induration is more localized.\par -Warm soaks and ice.\par -Cane prn\par - PT and home exercises to work on ROM , flexibility and strengthening.\par - 100% temporary impairment.\par -F/U 4-5 wks.

## 2020-02-26 NOTE — HISTORY OF PRESENT ILLNESS
[de-identified] : Mr. Pandya comes in for followup for his RIGHT knee injured February 2, 2020. He went for a Doppler ultrasound which was negative for DVT. \par He went for an MRI of his RIGHT knee on February 25, 2020 showing no tears or fractures. There is evidence of contusion in the soft tissues of the medial knee.\par The RIGHT knee and thigh pain is Decreasing but he still has pain and tenderness. He is able to move the knee better and can walk a little better. He has been taking some ibuprofen or Tylenol as needed for pain but still needs to take it every day..\par

## 2020-04-07 ENCOUNTER — APPOINTMENT (OUTPATIENT)
Dept: ORTHOPEDIC SURGERY | Facility: CLINIC | Age: 50
End: 2020-04-07
Payer: OTHER MISCELLANEOUS

## 2020-04-07 DIAGNOSIS — Z47.89 ENCOUNTER FOR OTHER ORTHOPEDIC AFTERCARE: ICD-10-CM

## 2020-04-07 PROCEDURE — 99213 OFFICE O/P EST LOW 20 MIN: CPT | Mod: 95

## 2020-04-07 NOTE — ASSESSMENT
[FreeTextEntry1] : 49-year-old with a work-related injury to his RIGHT knee and thigh from a fall through some tracks 2 mos ago at work. His knee is improving. THe area of swelling and induration is more localized. He still has variable mild discomfort localized to the medial side of his knee His work involves a lot of standing and walking and bending. He does not feel that he is ready to go back to work full duty. There is no option for light duty or desk work which he could do at this point.\par -Warm soaks and ice.\par - Increase walking as tolerated. He obviously no longer needs to use a cane.\par - PT and home exercises to work on ROM , flexibility and strengthening.\par - 50% temporary impairment.\par -F/U 4-5 wks.

## 2020-04-07 NOTE — PHYSICAL EXAM
[LE] : Sensory: Intact in bilateral lower extremities [Normal RLE] : Right Lower Extremity: No scars, rashes, lesions, ulcers, skin intact [Normal LLE] : Left Lower Extremity: No scars, rashes, lesions, ulcers, skin intact [Normal Touch] : sensation intact for touch [Normal] : Oriented to person, place, and time, insight and judgement were intact and the affect was normal [de-identified] : RIGHT Knee/thigh:\par Normal gait.\par No effusion.\par There is a scar over the medial knee where he has some focal tenderness. The area of ecchymoses and induration and brownish discoloration and swelling have all decreased considerably and is much more localized now.\par ROM: 0 degrees extension to 135 degrees flexion- He could squat fully and just feels discomfort in the RIGHT medial knee but no pain on the LEFT.\par Intact extensor mechanism/straight leg raise\par NVI distally.\par  [de-identified] : overweight [de-identified] : No respiratory distress or coughing

## 2020-04-07 NOTE — HISTORY OF PRESENT ILLNESS
[Home] : at home, [unfilled] , at the time of the visit. [Medical Office: ([facility])___] : at ~his/her~ medical office located in [place] [Patient] : the patient [FreeTextEntry2] : Copy of consent was emailed to patient for review. [de-identified] : Followup for her RIGHT knee contusion that occurred February 2, 2020 working when his legs felt between the railroad ties.\par The MRI showed no fractures or tears but significant edema subcutaneous and evidence of hematoma in the medial knee/thigh area.\par His leg is feeling Much better now.It still moderate 100 percent and he gets some pain to a variable degree ranging from 1-4/10 over the medial side of his knee The pain typically is aggravated by walking long distances or putting his knee in a figure-of-four position. He can't squat. He is gone to physical therapy twice in person and then has been doing telemedicine and physical therapy appointments. The day after therapy his knee sometimes is sore particularly when he is doing a lot of the steps. It sounds like he gets some pain around the patella. The area of swelling and induration has gone down considerably and is much more localized now. He does not take any medication for pain

## 2020-05-05 ENCOUNTER — FORM ENCOUNTER (OUTPATIENT)
Age: 50
End: 2020-05-05

## 2020-05-05 ENCOUNTER — APPOINTMENT (OUTPATIENT)
Dept: ORTHOPEDIC SURGERY | Facility: CLINIC | Age: 50
End: 2020-05-05
Payer: OTHER MISCELLANEOUS

## 2020-05-05 PROCEDURE — 99213 OFFICE O/P EST LOW 20 MIN: CPT | Mod: 95

## 2020-05-05 NOTE — ASSESSMENT
[FreeTextEntry1] : 49-year-old with a work-related injury to his RIGHT knee and thigh from a fall through some tracks 3+ mos ago at work. His knee is improving but not fully better with some pain and tenderness still medial knee and intermittent right hip and calf pain.\par -Warm soaks and ice.\par - Increase walking as tolerated. she go outside for progressively longer walks. He needs to build up stamina for when he goes back to work.\par - PT and home exercises to work on ROM , flexibility and strengthening.\par - 50% temporary impairment.\par -F/U 4-5 wks. Hopefully by that time he can go back to work full duty.

## 2020-05-05 NOTE — HISTORY OF PRESENT ILLNESS
[Home] : at home, [unfilled] , at the time of the visit. [Medical Office: ([facility])___] : at the medical office located in  [Patient] : the patient [de-identified] : Mr. Pandya presents for Followup for her RIGHT knee contusion that occurred February 2, 2020 while working.\par He states that his RIGHT knee and leg are feeling progressively better but not fully better.. \par Because of the coronavirus outbreak he has been home.\par He has been doing home virtual Physical therapy Initially twice a week and now once a week. He does exercises on his own. He was getting some pain intermittently in the lateral hip and RIGHT calf at times. The therapist is doing exercises working on stretching everything out and progressive strengthening. He is hoping to start Outpatient physical therapy at the facility in a couple of weeks which he thinks would be much better because they have a bike and other equipment which he had been using before the quarantine. \par Pain at this time it is on the mild side. He still has tenderness on the medial side of the knee where the scar is. He hasn't been taking any medication regularly but sometimes on Thursday after he's had a few sessions of physical therapy in a week he'll take Tylenol or 2. He hasn't been taking any NSAIDs.\par He has pain with deep squatting and his job requires him to kneel and squat and therefore he doesn't feel ready. He thinks there is still some mild swelling in the calf.\par

## 2020-05-05 NOTE — PHYSICAL EXAM
[LE] : Sensory: Intact in bilateral lower extremities [Normal RLE] : Right Lower Extremity: No scars, rashes, lesions, ulcers, skin intact [Normal LLE] : Left Lower Extremity: No scars, rashes, lesions, ulcers, skin intact [Normal Touch] : sensation intact for touch [Normal] : Gait: normal [de-identified] : RIGHT Knee/LE:\par Normal gait. He can walk on his heels and toes without any difficulty or pain.\par No effusion.\par There is a scar over the medial knee where he has some focal tenderness. The area of ecchymoses and induration and brownish discoloration and swelling have all Resolved based on the Virtual appearance\par ROM: 0 degrees extension to 135 degrees flexion- He could squat fully and just feels discomfort in the RIGHT medial knee but no pain on the LEFT.\par Pain in the RIGHT medial knee if figure-of-four position\par Intact extensor mechanism/straight leg raise\par NVI distally.\par  [de-identified] : overweight [de-identified] : No respiratory distress or coughing

## 2020-05-19 ENCOUNTER — FORM ENCOUNTER (OUTPATIENT)
Age: 50
End: 2020-05-19

## 2020-06-09 ENCOUNTER — APPOINTMENT (OUTPATIENT)
Dept: ORTHOPEDIC SURGERY | Facility: CLINIC | Age: 50
End: 2020-06-09
Payer: OTHER MISCELLANEOUS

## 2020-06-09 PROCEDURE — 99213 OFFICE O/P EST LOW 20 MIN: CPT

## 2020-06-09 NOTE — PHYSICAL EXAM
[LE] : Sensory: Intact in bilateral lower extremities [Normal RLE] : Right Lower Extremity: No scars, rashes, lesions, ulcers, skin intact [Normal LLE] : Left Lower Extremity: No scars, rashes, lesions, ulcers, skin intact [Normal Touch] : sensation intact for touch [Normal] : Oriented to person, place, and time, insight and judgement were intact and the affect was normal [de-identified] : overweight [de-identified] : RIGHT Knee/LE:\par Normal gait. He can squat fully without pain or difficulty.\par No effusion.\par There is a scar over the medial knee Which is now soft and not tender. There is similar tenderness along the posterior medial crest of the tibia bilaterally.. no erythema or ecchymoses\par ROM: 0 degrees extension to 135 degrees flexion\par Negative Abdiel.\par  Normal varus and valgus laxity without pain.1A lachman\par Intact extensor mechanism/straight leg raise\par NVI distally.\par  [de-identified] : No respiratory distress or coughing

## 2020-06-09 NOTE — ASSESSMENT
[FreeTextEntry1] : 50-year-old with a work-related injury to his RIGHT knee and thigh from a fall through some tracks 4 mos ago at work. His knee has recovered nicely and has good function. He is ready to go back to work.\par There is no temporary impairment at this time. We'll make sure he gets back to work and full activities and if he continues to do well then I would recommend closing the case at that time. He can followup in 6-8 weeks or as needed.\par He'll finish up with the physical therapy and continue with home strengthening exercises.

## 2020-06-09 NOTE — HISTORY OF PRESENT ILLNESS
[de-identified] : Mr. Pandya presents for Followup for her RIGHT knee contusion that occurred February 2, 2020 while working.\par He states that his RIGHT knee and leg are feeling progressively better. He is no longer having anyknee pain or problems with walking or function. He is feeling stronger with the exercises.\par Because of the coronavirus outbreak he has been home.\par He did virtual Physical therapy Initially twice a week and now is going to inpatient PT. He does exercises on his own. \par Pain is  0/10. No swelling.

## 2020-06-12 ENCOUNTER — NON-APPOINTMENT (OUTPATIENT)
Age: 50
End: 2020-06-12

## 2020-09-23 ENCOUNTER — APPOINTMENT (OUTPATIENT)
Dept: HEART AND VASCULAR | Facility: CLINIC | Age: 50
End: 2020-09-23
Payer: COMMERCIAL

## 2020-09-23 VITALS
TEMPERATURE: 97.6 F | WEIGHT: 204.99 LBS | HEIGHT: 68 IN | BODY MASS INDEX: 31.07 KG/M2 | DIASTOLIC BLOOD PRESSURE: 90 MMHG | SYSTOLIC BLOOD PRESSURE: 130 MMHG | OXYGEN SATURATION: 98 % | HEART RATE: 53 BPM

## 2020-09-23 DIAGNOSIS — R10.11 RIGHT UPPER QUADRANT PAIN: ICD-10-CM

## 2020-09-23 PROCEDURE — 99214 OFFICE O/P EST MOD 30 MIN: CPT

## 2020-09-23 PROCEDURE — 36415 COLL VENOUS BLD VENIPUNCTURE: CPT

## 2020-09-23 NOTE — HISTORY OF PRESENT ILLNESS
[FreeTextEntry1] : Allergist- Dr Doss\par Samir- Feliberto Eaton\par GI- Dr Chanel\par \par Wife is  Ms As. Vasile

## 2020-09-23 NOTE — REVIEW OF SYSTEMS
[Urinary Frequency] : urinary frequency [Nocturia] : nocturia [Abdominal Pain] : abdominal pain [Joint Pain] : joint pain [Negative] : Neurological

## 2020-09-23 NOTE — ASSESSMENT
[FreeTextEntry1] : RUQ pain- most c/w acute cholecystitis.  Labs, sono.\par \par Health Maintainence- Colonoscopy done by Dr Chanel 1/2019, PSA and GUTIERREZ 11/30/18, PSA NL but microscopic hematuria, refer to Dr Wren.  He did not go. \par \par HTN-  BP good on Amlodipine. Not taking it. Pt to resume, admonished.\par \par Elevated Glucose- diet reviewed, A1c sent, 5.3\par \par HLD- , diet, wt loss reviewed.  Needs to lose weight, labs in 4-6 mos.  Will treat if above 160.\par \par Vertigo- CT head (-) in the ER\par \par BPH- could not reach entire gland, PSA sent.  Urologist is Dr Wren.  Flomax started by me 9/23/20\par \par Health Maintainence- Colonoscopy 1/2019 with Dr Chanel, PSA and GUTIERREZ 2018, needs Urology visit

## 2020-09-23 NOTE — REASON FOR VISIT
[FreeTextEntry1] : 49 y/o with HTN, dizziness glucose 110.\par Seen in ER Oct 25, 2018.  BP up, CT head (-). Sugar mildly elevated in ER, diet poor\par \par EKG: NSR, normal axis and intervals, no ST-Tw abnormalities. 11/30/18\par \par Labs reveal  and microscopic hematuria\par \par 9/27/19- getting spasms in the hands, has left ankle pain. BP up, not taking Norvasc\par 9/23/20  On Sept 3rd he ate out in a restaurant, that night got RUQ pain. This recurred x1. \par Nocturia x 2 and urgency reported.

## 2020-09-24 LAB
ALBUMIN SERPL ELPH-MCNC: 4.6 G/DL
ALP BLD-CCNC: 74 U/L
ALT SERPL-CCNC: 23 U/L
ANION GAP SERPL CALC-SCNC: 11 MMOL/L
AST SERPL-CCNC: 20 U/L
BASOPHILS # BLD AUTO: 0.03 K/UL
BASOPHILS NFR BLD AUTO: 0.8 %
BILIRUB SERPL-MCNC: 0.4 MG/DL
BUN SERPL-MCNC: 13 MG/DL
CALCIUM SERPL-MCNC: 9.8 MG/DL
CHLORIDE SERPL-SCNC: 101 MMOL/L
CHOLEST SERPL-MCNC: 192 MG/DL
CHOLEST/HDLC SERPL: 4.8 RATIO
CO2 SERPL-SCNC: 26 MMOL/L
CREAT SERPL-MCNC: 1.14 MG/DL
EOSINOPHIL # BLD AUTO: 0.08 K/UL
EOSINOPHIL NFR BLD AUTO: 2.1 %
ESTIMATED AVERAGE GLUCOSE: 105 MG/DL
GLUCOSE SERPL-MCNC: 87 MG/DL
HBA1C MFR BLD HPLC: 5.3 %
HCT VFR BLD CALC: 45.7 %
HDLC SERPL-MCNC: 40 MG/DL
HGB BLD-MCNC: 15.2 G/DL
IMM GRANULOCYTES NFR BLD AUTO: 0.3 %
LDLC SERPL CALC-MCNC: 126 MG/DL
LYMPHOCYTES # BLD AUTO: 1.84 K/UL
LYMPHOCYTES NFR BLD AUTO: 47.4 %
MAN DIFF?: NORMAL
MCHC RBC-ENTMCNC: 30.7 PG
MCHC RBC-ENTMCNC: 33.3 GM/DL
MCV RBC AUTO: 92.3 FL
MONOCYTES # BLD AUTO: 0.34 K/UL
MONOCYTES NFR BLD AUTO: 8.8 %
NEUTROPHILS # BLD AUTO: 1.58 K/UL
NEUTROPHILS NFR BLD AUTO: 40.6 %
PLATELET # BLD AUTO: 273 K/UL
POTASSIUM SERPL-SCNC: 4.5 MMOL/L
PROT SERPL-MCNC: 7.3 G/DL
PSA SERPL-MCNC: 0.48 NG/ML
RBC # BLD: 4.95 M/UL
RBC # FLD: 12.1 %
SODIUM SERPL-SCNC: 138 MMOL/L
TRIGL SERPL-MCNC: 129 MG/DL
WBC # FLD AUTO: 3.88 K/UL

## 2020-10-06 ENCOUNTER — OUTPATIENT (OUTPATIENT)
Dept: OUTPATIENT SERVICES | Facility: HOSPITAL | Age: 50
LOS: 1 days | End: 2020-10-06
Payer: COMMERCIAL

## 2020-10-06 ENCOUNTER — APPOINTMENT (OUTPATIENT)
Dept: ORTHOPEDIC SURGERY | Facility: CLINIC | Age: 50
End: 2020-10-06
Payer: OTHER MISCELLANEOUS

## 2020-10-06 ENCOUNTER — FORM ENCOUNTER (OUTPATIENT)
Age: 50
End: 2020-10-06

## 2020-10-06 PROCEDURE — 76705 ECHO EXAM OF ABDOMEN: CPT

## 2020-10-06 PROCEDURE — 99213 OFFICE O/P EST LOW 20 MIN: CPT

## 2020-10-06 PROCEDURE — 76705 ECHO EXAM OF ABDOMEN: CPT | Mod: 26

## 2020-10-06 RX ORDER — NAPROXEN 500 MG/1
500 TABLET ORAL
Refills: 0 | Status: COMPLETED | COMMUNITY
End: 2020-10-06

## 2020-10-06 RX ORDER — COLCHICINE 0.6 MG/1
0.6 CAPSULE ORAL
Qty: 14 | Refills: 1 | Status: COMPLETED | COMMUNITY
Start: 2019-10-07 | End: 2020-10-06

## 2020-10-06 NOTE — HISTORY OF PRESENT ILLNESS
[de-identified] : Mr. Pandya presents for Followup for her RIGHT knee contusion that occurred February 2, 2020 while working.\par His RIGHT knee was doing very well for the last couple months. The pain had gone away but then suddenly in the last week for no apparent reason it started to hurt RIGHT in the area of the injury on the medial side of his knee. He feels stiffness in the knee. When he sits for a while and gets up it can be stiff. He can move it. There was no swelling. He's not taking anything for it.\par He hasn't done any physical therapy for several months now\par He has been back to work full time and feels comfortable working.

## 2020-10-06 NOTE — ASSESSMENT
[FreeTextEntry1] : 50-year-old with a work-related injury to his RIGHT knee and thigh from a fall through some tracks 8 mos ago at work. His knee he had recovered well several months ago when he was back to work full time. Then suddenly in the last week he has some pain and stiffness in the same region as the prior injury.On exam there is no swelling in the knee joint and there is good motion in the knee feels stable.\par I recommended doing some home exercises and doing a little more physical therapy. Heat and ice as needed. Ibuprofen 400-800 mg twice a day with meals for the next week or 2 or as needed after that. He may continue working.\par There is 10 percent impairment of the RIGHT knee.\par no restrictions with work.\par Followup in 4-6 weeks.

## 2020-10-06 NOTE — PHYSICAL EXAM
[LE] : Sensory: Intact in bilateral lower extremities [Normal RLE] : Right Lower Extremity: No scars, rashes, lesions, ulcers, skin intact [Normal LLE] : Left Lower Extremity: No scars, rashes, lesions, ulcers, skin intact [Normal Touch] : sensation intact for touch [Normal] : Oriented to person, place, and time, insight and judgement were intact and the affect was normal [de-identified] : RIGHT Knee/LE:\par Normal gait. He can squat fully without pain or difficulty.\par No effusion. Skin is intact. No ecchymoses or erythema\par There is a scar over the medial knee which is now soft and not tender. \par There is some point tenderness RIGHT on the joint line overlying the scar. No other tenderness around the knee.\par ROM: 0 degrees extension to 135 degrees flexion. Mild discomfort RIGHT knee on maximum flexion and no pain on the LEFT knee.\par Negative Abdiel.\par Normal varus and valgus laxity without pain.1A Lachman\par Intact extensor mechanism/straight leg raise\par NVI distally.\par  [de-identified] : overweight [de-identified] : No respiratory distress or coughing

## 2020-10-06 NOTE — REASON FOR VISIT
[Follow-Up Visit] : a follow-up visit for [Worker's Compensation] : This visit is related to worker's compensation [Knee Pain] : knee pain [Knee Injury] : knee injury [FreeTextEntry2] : 2/2/20

## 2020-10-22 ENCOUNTER — FORM ENCOUNTER (OUTPATIENT)
Age: 50
End: 2020-10-22

## 2020-10-26 LAB
APPEARANCE: CLEAR
BACTERIA: NEGATIVE
BILIRUBIN URINE: NEGATIVE
BLOOD URINE: NORMAL
COLOR: YELLOW
GLUCOSE QUALITATIVE U: NEGATIVE
HYALINE CASTS: 0 /LPF
KETONES URINE: NEGATIVE
LEUKOCYTE ESTERASE URINE: NEGATIVE
MICROSCOPIC-UA: NORMAL
NITRITE URINE: NEGATIVE
PH URINE: 7
PROTEIN URINE: NEGATIVE
RED BLOOD CELLS URINE: 7 /HPF
SPECIFIC GRAVITY URINE: 1.02
SQUAMOUS EPITHELIAL CELLS: 0 /HPF
URINE COMMENTS: NORMAL
UROBILINOGEN URINE: NORMAL
WHITE BLOOD CELLS URINE: 0 /HPF

## 2020-11-16 ENCOUNTER — APPOINTMENT (OUTPATIENT)
Dept: ORTHOPEDIC SURGERY | Facility: CLINIC | Age: 50
End: 2020-11-16

## 2020-12-04 ENCOUNTER — APPOINTMENT (OUTPATIENT)
Dept: ORTHOPEDIC SURGERY | Facility: CLINIC | Age: 50
End: 2020-12-04
Payer: OTHER MISCELLANEOUS

## 2020-12-04 DIAGNOSIS — M25.661 STIFFNESS OF RIGHT KNEE, NOT ELSEWHERE CLASSIFIED: ICD-10-CM

## 2020-12-04 PROCEDURE — 99213 OFFICE O/P EST LOW 20 MIN: CPT

## 2020-12-04 PROCEDURE — 99072 ADDL SUPL MATRL&STAF TM PHE: CPT

## 2020-12-04 NOTE — HISTORY OF PRESENT ILLNESS
[de-identified] : Mr. Pandya presents for followup for her RIGHT knee contusion that occurred February 2, 2020 while working.\par His RIGHT knee was doing very well for several mos until about 2-3 mos ago when he had increased pain.\par After I saw him 2 months ago he did get back into physical therapy. Unfortunately it took 3 weeks for the therapy to be able to get him in and he was only able to do some of the Therapy appointments that had been authorized. He did about 6 sessions. He does find it helpful but he's still having some intermittent medial knee pain. He tends to get pain when he sits with his leg in a cross legged position, figure-of-four position. When he gets out of that position he feels a stiffness and pain generally through his knee. He takes ibuprofen or Tylenol once every 1-2 weeks typically. No significant swelling or locking. Occasionally there is clicking.\par He has been back to work full time and feels comfortable working.

## 2020-12-04 NOTE — PHYSICAL EXAM
[de-identified] : RIGHT Knee/LE:\par Normal gait. He can squat fully without pain or difficulty.\par No effusion. Skin is intact. No ecchymoses or erythema\par There is a scar over the medial knee which is now soft\par There is some point tenderness RIGHT on the joint line overlying the scar. Minimal patellar tenderness.\par ROM: 0 degrees extension to 135 degrees flexion. Mild discomfort RIGHT knee on maximum flexion and no pain on the LEFT knee.\par Negative Abdiel.\par Normal varus and valgus laxity without pain.1A Lachman\par Intact extensor mechanism/straight leg raise\par NVI distally.\par  [de-identified] : overweight [de-identified] : No respiratory distress or coughing [de-identified] : \par We again reviewed the MRI images showing him the soft tissue edema medial knee. Normal ligaments, bones, meniscus, tendons

## 2020-12-09 ENCOUNTER — FORM ENCOUNTER (OUTPATIENT)
Age: 50
End: 2020-12-09

## 2021-01-05 ENCOUNTER — APPOINTMENT (OUTPATIENT)
Dept: ORTHOPEDIC SURGERY | Facility: CLINIC | Age: 51
End: 2021-01-05
Payer: OTHER MISCELLANEOUS

## 2021-01-05 DIAGNOSIS — M25.561 PAIN IN RIGHT KNEE: ICD-10-CM

## 2021-01-05 PROCEDURE — 99213 OFFICE O/P EST LOW 20 MIN: CPT

## 2021-01-05 PROCEDURE — 99072 ADDL SUPL MATRL&STAF TM PHE: CPT

## 2021-01-05 NOTE — ASSESSMENT
[FreeTextEntry1] : 50-year-old who had a contusion to his RIGHT knee medially and had a large hematoma which has resolved with just now an area of scar which is causing some intermittent mild residual discomfort. He has occasional days where he'll take some ibuprofen. There is no swelling or loss of motion and structurally the knee is sound otherwise.\par he should try to avoid sitting with his knee cross legged for any length of time. Heat and ice as needed. Continue with strengthening exercises.He did not complete his course of physical there holidays and will go back for a little more therapy. In the long run he really needs to do the exercises on his own on a regular basis.\par F/U 6 wks\par  He may work  full duty without restrictions

## 2021-01-05 NOTE — PHYSICAL EXAM
[LE] : Sensory: Intact in bilateral lower extremities [Normal RLE] : Right Lower Extremity: No scars, rashes, lesions, ulcers, skin intact [Normal LLE] : Left Lower Extremity: No scars, rashes, lesions, ulcers, skin intact [Normal Touch] : sensation intact for touch [Normal] : Oriented to person, place, and time, insight and judgement were intact and the affect was normal [de-identified] : RIGHT Knee/LE:\par Normal gait. He can squat fully without pain or difficulty.\par No effusion. Skin is intact- Scar medial knee where he had the abrasion. The scar is somewhat tender but soft and mobile. No ecchymoses or erythema\par There is tenderness over the RIGHT knee medial joint line just in the area of the scar. Otherwise no joint line tenderness.. Minimal patellar tenderness.\par ROM: 0 degrees extension to 135 degrees flexion. Mild discomfort RIGHT knee on maximum flexion and no pain on the LEFT knee.\par Negative Abdiel.\par Normal varus and valgus laxity without pain.1A Lachman\par Intact extensor mechanism/straight leg raise\par NVI distally.\par negative straight leg raise. [de-identified] : overweight [de-identified] : No respiratory distress or coughing [de-identified] : \par

## 2021-01-05 NOTE — HISTORY OF PRESENT ILLNESS
[de-identified] : Mr. Pandya presents for followup for her RIGHT knee contusion that occurred February 2, 2020 while working.\par He has had some intermittent RIGHT knee pain. Since I saw him he had a couple days where there was more pain. In general he doesn't have any chronic pain. He'll take ibuprofen about once a week when he has pain but not on a regular basis. He still gets a sense of stiffness in his RIGHT knee medially after he's been sitting with it in the cross legged position for a while and then straightens out the knee. Once it straightens out it's fine. No swelling or locking or buckling.

## 2021-01-12 ENCOUNTER — FORM ENCOUNTER (OUTPATIENT)
Age: 51
End: 2021-01-12

## 2021-01-15 ENCOUNTER — RX RENEWAL (OUTPATIENT)
Age: 51
End: 2021-01-15

## 2021-01-26 ENCOUNTER — FORM ENCOUNTER (OUTPATIENT)
Age: 51
End: 2021-01-26

## 2021-02-16 ENCOUNTER — APPOINTMENT (OUTPATIENT)
Dept: ORTHOPEDIC SURGERY | Facility: CLINIC | Age: 51
End: 2021-02-16

## 2021-04-16 ENCOUNTER — EMERGENCY (EMERGENCY)
Facility: HOSPITAL | Age: 51
LOS: 1 days | Discharge: HOME | End: 2021-04-16
Admitting: EMERGENCY MEDICINE
Payer: COMMERCIAL

## 2021-04-16 VITALS
TEMPERATURE: 98 F | HEIGHT: 68 IN | HEART RATE: 65 BPM | DIASTOLIC BLOOD PRESSURE: 97 MMHG | SYSTOLIC BLOOD PRESSURE: 143 MMHG | OXYGEN SATURATION: 98 % | RESPIRATION RATE: 18 BRPM

## 2021-04-16 DIAGNOSIS — Z20.822 CONTACT WITH AND (SUSPECTED) EXPOSURE TO COVID-19: ICD-10-CM

## 2021-04-16 DIAGNOSIS — R03.0 ELEVATED BLOOD-PRESSURE READING, WITHOUT DIAGNOSIS OF HYPERTENSION: ICD-10-CM

## 2021-04-16 LAB — SARS-COV-2 RNA SPEC QL NAA+PROBE: SIGNIFICANT CHANGE UP

## 2021-04-16 PROCEDURE — U0005: CPT

## 2021-04-16 PROCEDURE — U0003: CPT

## 2021-04-16 PROCEDURE — 99283 EMERGENCY DEPT VISIT LOW MDM: CPT

## 2021-04-16 PROCEDURE — 99282 EMERGENCY DEPT VISIT SF MDM: CPT

## 2021-04-16 NOTE — ED PROVIDER NOTE - PATIENT PORTAL LINK FT
You can access the FollowMyHealth Patient Portal offered by [facility] by registering at the following website: [email or web address]. By joining [employer]’s FollowMyHealth portal, you will also be able to view your health information using other applications (apps) compatible with our system.

## 2021-04-16 NOTE — ED PROVIDER NOTE - OBJECTIVE STATEMENT
Patient is presenting to the Emergency Department with a request to have COVID-19 testing for traveling. BP elevated, patient stated " I forgot to take by BP meds for the past 4 days". patient asymptomatic, no complaints  Denies symptoms, including cough, shortness of breath, fever, chills, bodyaches or sore throat. patient encourage to f/o with PCP for HTN and advised to take BP meds

## 2021-04-28 ENCOUNTER — EMERGENCY (EMERGENCY)
Facility: HOSPITAL | Age: 51
LOS: 1 days | Discharge: HOME | End: 2021-04-28
Admitting: EMERGENCY MEDICINE
Payer: COMMERCIAL

## 2021-04-28 VITALS
HEIGHT: 68 IN | HEART RATE: 63 BPM | SYSTOLIC BLOOD PRESSURE: 148 MMHG | RESPIRATION RATE: 18 BRPM | OXYGEN SATURATION: 98 % | DIASTOLIC BLOOD PRESSURE: 95 MMHG | TEMPERATURE: 98 F

## 2021-04-28 LAB — SARS-COV-2 RNA SPEC QL NAA+PROBE: SIGNIFICANT CHANGE UP

## 2021-04-28 PROCEDURE — U0003: CPT

## 2021-04-28 PROCEDURE — U0005: CPT

## 2021-04-28 PROCEDURE — 99283 EMERGENCY DEPT VISIT LOW MDM: CPT

## 2021-04-28 NOTE — ED PROVIDER NOTE - PATIENT PORTAL LINK FT
You can access the FollowMyHealth Patient Portal offered by [facility] by registering at the following website: [email or web address]. By joining [employer]’s FollowMyHealth portal, you will also be able to view your health information using other applications (apps) compatible with our system.

## 2021-04-28 NOTE — ED PROVIDER NOTE - OBJECTIVE STATEMENT
Patient is presenting to the Emergency Department with a request to have COVID-19 testing.  Denies symptoms, including cough, shortness of breath, fever, chills, bodyaches or sore throat. Returned from international travel.

## 2021-04-28 NOTE — ED PROVIDER NOTE - CARE PLAN
Principal Discharge DX:	Elevated blood pressure reading  Secondary Diagnosis:	Encounter for medical screening examination

## 2021-04-30 DIAGNOSIS — R03.0 ELEVATED BLOOD-PRESSURE READING, WITHOUT DIAGNOSIS OF HYPERTENSION: ICD-10-CM

## 2021-04-30 DIAGNOSIS — Z20.822 CONTACT WITH AND (SUSPECTED) EXPOSURE TO COVID-19: ICD-10-CM

## 2021-05-21 ENCOUNTER — APPOINTMENT (OUTPATIENT)
Dept: ORTHOPEDIC SURGERY | Facility: CLINIC | Age: 51
End: 2021-05-21
Payer: COMMERCIAL

## 2021-05-21 ENCOUNTER — APPOINTMENT (OUTPATIENT)
Dept: ORTHOPEDIC SURGERY | Facility: CLINIC | Age: 51
End: 2021-05-21
Payer: OTHER MISCELLANEOUS

## 2021-05-21 PROCEDURE — 99213 OFFICE O/P EST LOW 20 MIN: CPT

## 2021-05-21 PROCEDURE — 99072 ADDL SUPL MATRL&STAF TM PHE: CPT

## 2021-05-21 PROCEDURE — ZZZZZ: CPT

## 2021-05-21 NOTE — HISTORY OF PRESENT ILLNESS
[de-identified] : Mr. Pandya is a 51-year-old gentleman who I have been treating for a right medial knee contusion that occurred about 15 months ago.  In the last 4 to 5 weeks he developed pain in the right lateral hip and pelvis.  There was no specific injury or known inciting event.  It can hurt lying on his side or certain movements.\par He has been doing physical therapy for his knee.  He does some stretches.\par Pain sometimes hurts when walking or moving or lifting or rotating his hip certain directions.  No back pain.  No groin pain in the hip.  Pain can radiate along the lateral hip partially.

## 2021-05-21 NOTE — PHYSICAL EXAM
[de-identified] : RIGHT Knee/LE:\par Normal gait.  No pain walking\par No effusion. Skin is intact- Scar medial knee where he had the abrasion is tender focally at the level of the posteromedial knee joint but soft and mobile. No ecchymoses or erythema\par There is focal  tenderness over the RIGHT knee medial joint line just in the area of the scar. Otherwise no joint line tenderness.. Minimal patellar tenderness.\par ROM: 0 degrees extension to 135 degrees flexion. Mild discomfort RIGHT knee on maximum flexion and no pain on the LEFT knee.\par Negative Abdiel.\par Normal varus and valgus laxity without pain.\par 1A Lachman. -Pivot shift.  Normal anterior and posterior drawers\par Intact extensor mechanism/straight leg raise\par NVI distally.\par negative straight leg raise. [de-identified] : overweight [de-identified] : No respiratory distress or coughing [de-identified] : \par

## 2021-05-21 NOTE — ASSESSMENT
[FreeTextEntry1] : 51-year-old who had a contusion to his RIGHT knee 15 mos ago medially and had a large hematoma which resolved.  The scar tissue has softened up.  He has some focal tenderness near the scar.  Functionally and structurally his knee seems to be doing well.\par He does not seem to have pain to any significant degree or functional limitation at this time.\par He will finish up the therapy for the knee would do about 4 more weeks of therapy for his knee and then can continue on its own.  Heat and ice as needed.\par He is able to work without restrictions.\par 15% temporary impairment.\par Follow-up in 6 weeks

## 2021-05-21 NOTE — HISTORY OF PRESENT ILLNESS
[de-identified] : Mr. Pandya presents for followup for the RIGHT knee contusion that occurred about 15 mos ago February 2, 2020 while working.\par His knee is doing better now than it was a few months ago when it got aggravated.  He gets some mild medial knee pain or stiffness but overall is good.  No swelling or locking or buckling.  He did more physical therapy and is continuing with a little more therapy which has helped.\par He is not taking any medication for pain

## 2021-05-21 NOTE — ASSESSMENT
[FreeTextEntry1] : 51-year-old right lateral hip pain over the last month or so without any injury.  Pain localizes to the lateral upper pelvic region of the gluteal tendons.  He likely has some gluteal tendinopathy.  I recommended heat, ice, stretches of the gluteal muscles and hip rotators progressive strengthening.  He was given a prescription for physical therapy.  Ibuprofen or naproxen as needed for a few weeks.\par Avoid painful activities.\par Follow-up if is not better in 6 weeks.

## 2021-05-21 NOTE — PHYSICAL EXAM
[LE] : Sensory: Intact in bilateral lower extremities [Normal RLE] : Right Lower Extremity: No scars, rashes, lesions, ulcers, skin intact [Normal LLE] : Left Lower Extremity: No scars, rashes, lesions, ulcers, skin intact [Normal Touch] : sensation intact for touch [Normal] : No swelling, no edema, normal pedal pulses and normal temperature [de-identified] : Right hip\par normal gait.\par Tender just inferior to the superior iliac crest over the gluteal tendon origin.\par Nontender greater trochanter.\par 5/5 hip abduction strength bilaterally with mild pain on the right.\par Straight leg raises to about 70 degrees with tight hamstrings.\par Right hip range of motion is with about 135 degrees flexion, 20 degrees internal rotation and 45 degrees of external rotation with pain on external rotation on the right.\par Normal neurovascular exam distally.

## 2021-05-23 ENCOUNTER — FORM ENCOUNTER (OUTPATIENT)
Age: 51
End: 2021-05-23

## 2021-06-07 ENCOUNTER — APPOINTMENT (OUTPATIENT)
Dept: HEART AND VASCULAR | Facility: CLINIC | Age: 51
End: 2021-06-07
Payer: COMMERCIAL

## 2021-06-07 ENCOUNTER — NON-APPOINTMENT (OUTPATIENT)
Age: 51
End: 2021-06-07

## 2021-06-07 VITALS
OXYGEN SATURATION: 98 % | HEIGHT: 68 IN | HEART RATE: 64 BPM | DIASTOLIC BLOOD PRESSURE: 80 MMHG | TEMPERATURE: 98.8 F | BODY MASS INDEX: 32.13 KG/M2 | WEIGHT: 212 LBS | SYSTOLIC BLOOD PRESSURE: 130 MMHG

## 2021-06-07 DIAGNOSIS — J30.89 OTHER ALLERGIC RHINITIS: ICD-10-CM

## 2021-06-07 PROCEDURE — 93000 ELECTROCARDIOGRAM COMPLETE: CPT

## 2021-06-07 PROCEDURE — 99214 OFFICE O/P EST MOD 30 MIN: CPT

## 2021-06-07 PROCEDURE — 99072 ADDL SUPL MATRL&STAF TM PHE: CPT

## 2021-06-07 NOTE — REASON FOR VISIT
[FreeTextEntry1] : 50 y/o with HTN, dizziness glucose 110.\par Seen in ER Oct 25, 2018.  BP up, CT head (-). Sugar mildly elevated in ER, diet poor\par \par EKG: NSR, normal axis and intervals, no ST-Tw abnormalities. 11/30/18\par \par Labs reveal  and microscopic hematuria\par \par 9/27/19- getting spasms in the hands, has left ankle pain. BP up, not taking Norvasc\par 9/23/20  On Sept 3rd he ate out in a restaurant, that night got RUQ pain. This recurred x1. \par Nocturia x 2 and urgency reported.\par 6/7/21 Needs the vaccine, discussed. c/o back pain and LH/vertigo.  Takes a high caffeine beverage with improvement.  Father has prostate CA.  Does  a lot of vitamins, Apple cidre vinegar gummies, green detox, Janie-tumeric drink, etc

## 2021-06-07 NOTE — ASSESSMENT
[FreeTextEntry1] : RUQ pain- most c/w acute cholecystitis.  Labs, sono. Labs OK, Sono OK\par \par Back Pain-  exercises given\par \par Health Maintainence- Colonoscopy done by Dr Chanel 1/2019, PSA and GUTIERREZ 11/30/18, PSA NL in Sept 2020.  but microscopic hematuria, refer to Dr Wren.  He did not go. \par \par HTN-  BP good on Amlodipine. Not taking it. Pt to resume, admonished.\par \par Elevated Glucose- diet reviewed, A1c sent, 5.3\par \par HLD- , diet, wt loss reviewed.  Needs to lose weight, labs in 4-6 mos.  Will treat if above 160.\par \par Seasonal allergies- Allegra and Fluticasone\par \par Vertigo- CT head (-) in the ER.  Sxs better with occasional use of Meclizine\par \par BPH- could not reach entire gland, PSA sent.  Urologist is Dr Wren.  Flomax started by me 9/23/20\par \par Health Maintainence- Colonoscopy 1/2019 with Dr Chanel, PSA and GUTIERREZ 2018, Sept 2020, will repeat next visit, father dx with Prostate CA

## 2021-06-09 ENCOUNTER — FORM ENCOUNTER (OUTPATIENT)
Age: 51
End: 2021-06-09

## 2021-07-09 ENCOUNTER — APPOINTMENT (OUTPATIENT)
Dept: ORTHOPEDIC SURGERY | Facility: CLINIC | Age: 51
End: 2021-07-09

## 2021-10-14 ENCOUNTER — RX RENEWAL (OUTPATIENT)
Age: 51
End: 2021-10-14

## 2021-11-10 ENCOUNTER — APPOINTMENT (OUTPATIENT)
Dept: HEART AND VASCULAR | Facility: CLINIC | Age: 51
End: 2021-11-10
Payer: COMMERCIAL

## 2021-11-10 VITALS
OXYGEN SATURATION: 96 % | HEART RATE: 62 BPM | BODY MASS INDEX: 32.89 KG/M2 | HEIGHT: 68 IN | DIASTOLIC BLOOD PRESSURE: 94 MMHG | TEMPERATURE: 97.5 F | SYSTOLIC BLOOD PRESSURE: 145 MMHG | WEIGHT: 217 LBS

## 2021-11-10 DIAGNOSIS — Z00.00 ENCOUNTER FOR GENERAL ADULT MEDICAL EXAMINATION W/OUT ABNORMAL FINDINGS: ICD-10-CM

## 2021-11-10 PROCEDURE — 36415 COLL VENOUS BLD VENIPUNCTURE: CPT

## 2021-11-10 PROCEDURE — 93306 TTE W/DOPPLER COMPLETE: CPT

## 2021-11-10 PROCEDURE — 99213 OFFICE O/P EST LOW 20 MIN: CPT

## 2021-11-10 NOTE — REASON FOR VISIT
[FreeTextEntry1] : 51 y/o with HTN, dizziness glucose 110.\par Seen in ER Oct 25, 2018.  BP up, CT head (-). Sugar mildly elevated in ER, diet poor\par \par EKG: NSR, normal axis and intervals, no ST-Tw abnormalities. 11/30/18\par \par Labs reveal  and microscopic hematuria\par \par 9/27/19- getting spasms in the hands, has left ankle pain. BP up, not taking Norvasc\par 9/23/20  On Sept 3rd he ate out in a restaurant, that night got RUQ pain. This recurred x1. \par Nocturia x 2 and urgency reported.\par 11/10/21 Last here June.  Has not been vaccinated for Covid, long discussion

## 2021-11-10 NOTE — ASSESSMENT
[FreeTextEntry1] : RUQ pain- most c/w acute cholecystitis.  Labs, sono.  Never recurred\par \par Health Maintainence- Colonoscopy done by Dr Chanel 1/2019, PSA and GUTIERREZ 11/30/18, PSA NL but microscopic hematuria, refer to Dr Wren.  He did not go. \par \par HTN-  BP good on Amlodipine. Not taking it. Pt to resume, admonished.  BP up slightly, adding Dyazide weekly\par \par Elevated Glucose- diet reviewed, A1c sent, 5.3\par \par HLD- , diet, wt loss reviewed.  Needs to lose weight, labs in 4-6 mos.  Will treat if above 160.\par \par Vertigo- CT head (-) in the ER\par \par BPH- could not reach entire gland, PSA sent.  Urologist is Dr Wren.  Flomax started by me 9/23/20, not taking\par \par Health Maintainence- Colonoscopy 1/2019 with Dr Chanel, PSA and GUTIERREZ 2018, needs Urology visit

## 2021-11-15 LAB
ALBUMIN SERPL ELPH-MCNC: 4.5 G/DL
ALP BLD-CCNC: 71 U/L
ALT SERPL-CCNC: 23 U/L
ANION GAP SERPL CALC-SCNC: 13 MMOL/L
APPEARANCE: CLEAR
AST SERPL-CCNC: 19 U/L
BACTERIA: NEGATIVE
BASOPHILS # BLD AUTO: 0.03 K/UL
BASOPHILS NFR BLD AUTO: 0.7 %
BILIRUB SERPL-MCNC: 0.3 MG/DL
BILIRUBIN URINE: NEGATIVE
BLOOD URINE: NORMAL
BUN SERPL-MCNC: 15 MG/DL
CALCIUM SERPL-MCNC: 9.8 MG/DL
CHLORIDE SERPL-SCNC: 103 MMOL/L
CHOLEST SERPL-MCNC: 214 MG/DL
CO2 SERPL-SCNC: 24 MMOL/L
COLOR: YELLOW
CREAT SERPL-MCNC: 1.15 MG/DL
EOSINOPHIL # BLD AUTO: 0.09 K/UL
EOSINOPHIL NFR BLD AUTO: 2.1 %
ESTIMATED AVERAGE GLUCOSE: 105 MG/DL
GLUCOSE QUALITATIVE U: NEGATIVE
GLUCOSE SERPL-MCNC: 109 MG/DL
HBA1C MFR BLD HPLC: 5.3 %
HCT VFR BLD CALC: 45 %
HDLC SERPL-MCNC: 39 MG/DL
HGB BLD-MCNC: 15 G/DL
HYALINE CASTS: 0 /LPF
IMM GRANULOCYTES NFR BLD AUTO: 0 %
KETONES URINE: NEGATIVE
LDLC SERPL CALC-MCNC: 140 MG/DL
LEUKOCYTE ESTERASE URINE: NEGATIVE
LYMPHOCYTES # BLD AUTO: 2.1 K/UL
LYMPHOCYTES NFR BLD AUTO: 50.1 %
MAN DIFF?: NORMAL
MCHC RBC-ENTMCNC: 31 PG
MCHC RBC-ENTMCNC: 33.3 GM/DL
MCV RBC AUTO: 93 FL
MICROSCOPIC-UA: NORMAL
MONOCYTES # BLD AUTO: 0.32 K/UL
MONOCYTES NFR BLD AUTO: 7.6 %
NEUTROPHILS # BLD AUTO: 1.65 K/UL
NEUTROPHILS NFR BLD AUTO: 39.5 %
NITRITE URINE: NEGATIVE
NONHDLC SERPL-MCNC: 175 MG/DL
PH URINE: 6
PLATELET # BLD AUTO: 272 K/UL
POTASSIUM SERPL-SCNC: 4.3 MMOL/L
PROT SERPL-MCNC: 7.3 G/DL
PROTEIN URINE: NEGATIVE
PSA SERPL-MCNC: 0.49 NG/ML
RBC # BLD: 4.84 M/UL
RBC # FLD: 12.2 %
RED BLOOD CELLS URINE: 7 /HPF
SODIUM SERPL-SCNC: 141 MMOL/L
SPECIFIC GRAVITY URINE: 1.02
SQUAMOUS EPITHELIAL CELLS: 0 /HPF
TRIGL SERPL-MCNC: 176 MG/DL
TSH SERPL-ACNC: 1.79 UIU/ML
UROBILINOGEN URINE: NORMAL
WBC # FLD AUTO: 4.19 K/UL
WHITE BLOOD CELLS URINE: 0 /HPF

## 2021-12-14 ENCOUNTER — NON-APPOINTMENT (OUTPATIENT)
Age: 51
End: 2021-12-14

## 2021-12-15 ENCOUNTER — NON-APPOINTMENT (OUTPATIENT)
Age: 51
End: 2021-12-15

## 2021-12-15 ENCOUNTER — APPOINTMENT (OUTPATIENT)
Dept: UROLOGY | Facility: CLINIC | Age: 51
End: 2021-12-15
Payer: COMMERCIAL

## 2021-12-15 VITALS
WEIGHT: 215 LBS | BODY MASS INDEX: 32.58 KG/M2 | DIASTOLIC BLOOD PRESSURE: 87 MMHG | HEIGHT: 68 IN | TEMPERATURE: 95.1 F | HEART RATE: 58 BPM | SYSTOLIC BLOOD PRESSURE: 144 MMHG

## 2021-12-15 PROCEDURE — 99204 OFFICE O/P NEW MOD 45 MIN: CPT

## 2021-12-15 NOTE — PHYSICAL EXAM
[General Appearance - Well Developed] : well developed [General Appearance - Well Nourished] : well nourished [Normal Appearance] : normal appearance [Well Groomed] : well groomed [General Appearance - In No Acute Distress] : no acute distress [Edema] : no peripheral edema [Exaggerated Use Of Accessory Muscles For Inspiration] : no accessory muscle use [Abdomen Soft] : soft [Abdomen Tenderness] : non-tender [Costovertebral Angle Tenderness] : no ~M costovertebral angle tenderness [Normal Station and Gait] : the gait and station were normal for the patient's age [] : no rash [No Focal Deficits] : no focal deficits [Oriented To Time, Place, And Person] : oriented to person, place, and time [Affect] : the affect was normal [Mood] : the mood was normal [Not Anxious] : not anxious [No Palpable Adenopathy] : no palpable adenopathy

## 2021-12-15 NOTE — HISTORY OF PRESENT ILLNESS
[FreeTextEntry1] : 52 y/o male with HTN and h/o nephrolithiasis  presenting for f/u of microscopic hematuria (7 RBC) found on PCP routine labs 11/10. He's had this in the past. No recent imaging. \par Endorses frequency, urgency, nocturia x2.\par Denies straining, PVD. Feels as though he empties his bladder. \par Denies gross hematuria, dysuria, flank pain\par Was prescribed Tamsulosin by PCP but not taking at this time\par \par Hx of nephrolithiasis \par - 1st stone 5+, years ago\par - 2nd stone - about 5 years treated with lithotripsy + stents\par \par Denies FM of  malignancies\par non smoker\par \par \par

## 2021-12-15 NOTE — ASSESSMENT
[FreeTextEntry1] : 52 y/o male with microscopic hematuria and LUTS. remote history of nephrolithiasis\par Discussed DDx microhematuria\par Recommend CT urogram\par Recommend cysto. reviewed procedure, indications. \par \par LUTS \par discussed lifestyle modifications\par \par

## 2022-01-10 ENCOUNTER — RX RENEWAL (OUTPATIENT)
Age: 52
End: 2022-01-10

## 2022-01-27 ENCOUNTER — APPOINTMENT (OUTPATIENT)
Dept: UROLOGY | Facility: CLINIC | Age: 52
End: 2022-01-27

## 2022-01-31 ENCOUNTER — RESULT REVIEW (OUTPATIENT)
Age: 52
End: 2022-01-31

## 2022-01-31 ENCOUNTER — APPOINTMENT (OUTPATIENT)
Dept: CT IMAGING | Facility: HOSPITAL | Age: 52
End: 2022-01-31
Payer: COMMERCIAL

## 2022-01-31 ENCOUNTER — APPOINTMENT (OUTPATIENT)
Dept: UROLOGY | Facility: CLINIC | Age: 52
End: 2022-01-31
Payer: COMMERCIAL

## 2022-01-31 ENCOUNTER — OUTPATIENT (OUTPATIENT)
Dept: OUTPATIENT SERVICES | Facility: HOSPITAL | Age: 52
LOS: 1 days | End: 2022-01-31
Payer: COMMERCIAL

## 2022-01-31 VITALS — DIASTOLIC BLOOD PRESSURE: 95 MMHG | SYSTOLIC BLOOD PRESSURE: 153 MMHG | TEMPERATURE: 97.3 F | HEART RATE: 59 BPM

## 2022-01-31 DIAGNOSIS — Z87.442 PERSONAL HISTORY OF URINARY CALCULI: ICD-10-CM

## 2022-01-31 DIAGNOSIS — R31.29 OTHER MICROSCOPIC HEMATURIA: ICD-10-CM

## 2022-01-31 PROCEDURE — 74178 CT ABD&PLV WO CNTR FLWD CNTR: CPT

## 2022-01-31 PROCEDURE — 99214 OFFICE O/P EST MOD 30 MIN: CPT | Mod: 57

## 2022-01-31 PROCEDURE — 74178 CT ABD&PLV WO CNTR FLWD CNTR: CPT | Mod: 26

## 2022-02-01 ENCOUNTER — NON-APPOINTMENT (OUTPATIENT)
Age: 52
End: 2022-02-01

## 2022-02-01 ENCOUNTER — APPOINTMENT (OUTPATIENT)
Dept: HEART AND VASCULAR | Facility: CLINIC | Age: 52
End: 2022-02-01
Payer: COMMERCIAL

## 2022-02-01 VITALS
WEIGHT: 214.99 LBS | HEIGHT: 68 IN | SYSTOLIC BLOOD PRESSURE: 121 MMHG | OXYGEN SATURATION: 98 % | DIASTOLIC BLOOD PRESSURE: 76 MMHG | BODY MASS INDEX: 32.58 KG/M2 | HEART RATE: 61 BPM | TEMPERATURE: 97.7 F

## 2022-02-01 DIAGNOSIS — Z01.810 ENCOUNTER FOR PREPROCEDURAL CARDIOVASCULAR EXAMINATION: ICD-10-CM

## 2022-02-01 LAB
ALBUMIN SERPL ELPH-MCNC: 4.6 G/DL
ALP BLD-CCNC: 66 U/L
ALT SERPL-CCNC: 22 U/L
ANION GAP SERPL CALC-SCNC: 13 MMOL/L
APPEARANCE: CLEAR
APTT BLD: 33.2 SEC
AST SERPL-CCNC: 17 U/L
BACTERIA: NEGATIVE
BASOPHILS # BLD AUTO: 0.03 K/UL
BASOPHILS NFR BLD AUTO: 0.7 %
BILIRUB SERPL-MCNC: 0.4 MG/DL
BILIRUBIN URINE: NEGATIVE
BLOOD URINE: ABNORMAL
BUN SERPL-MCNC: 17 MG/DL
CALCIUM SERPL-MCNC: 9.4 MG/DL
CHLORIDE SERPL-SCNC: 100 MMOL/L
CO2 SERPL-SCNC: 24 MMOL/L
COLOR: NORMAL
CREAT SERPL-MCNC: 1.14 MG/DL
EOSINOPHIL # BLD AUTO: 0.14 K/UL
EOSINOPHIL NFR BLD AUTO: 3.1 %
GLUCOSE QUALITATIVE U: NEGATIVE
GLUCOSE SERPL-MCNC: 114 MG/DL
HCT VFR BLD CALC: 42.6 %
HGB BLD-MCNC: 14.2 G/DL
HYALINE CASTS: 0 /LPF
IMM GRANULOCYTES NFR BLD AUTO: 0.2 %
INR PPP: 1.05 RATIO
KETONES URINE: NEGATIVE
LEUKOCYTE ESTERASE URINE: NEGATIVE
LYMPHOCYTES # BLD AUTO: 1.99 K/UL
LYMPHOCYTES NFR BLD AUTO: 44.7 %
MAN DIFF?: NORMAL
MCHC RBC-ENTMCNC: 30.7 PG
MCHC RBC-ENTMCNC: 33.3 GM/DL
MCV RBC AUTO: 92 FL
MICROSCOPIC-UA: NORMAL
MONOCYTES # BLD AUTO: 0.43 K/UL
MONOCYTES NFR BLD AUTO: 9.7 %
NEUTROPHILS # BLD AUTO: 1.85 K/UL
NEUTROPHILS NFR BLD AUTO: 41.6 %
NITRITE URINE: NEGATIVE
PH URINE: 6.5
PLATELET # BLD AUTO: 247 K/UL
POTASSIUM SERPL-SCNC: 4.2 MMOL/L
PROT SERPL-MCNC: 7.1 G/DL
PROTEIN URINE: NEGATIVE
PT BLD: 12.5 SEC
RBC # BLD: 4.63 M/UL
RBC # FLD: 12.5 %
RED BLOOD CELLS URINE: 6 /HPF
SODIUM SERPL-SCNC: 137 MMOL/L
SPECIFIC GRAVITY URINE: >1.05
SQUAMOUS EPITHELIAL CELLS: 0 /HPF
UROBILINOGEN URINE: NORMAL
WBC # FLD AUTO: 4.45 K/UL
WHITE BLOOD CELLS URINE: 0 /HPF

## 2022-02-01 PROCEDURE — 93000 ELECTROCARDIOGRAM COMPLETE: CPT

## 2022-02-01 PROCEDURE — 99213 OFFICE O/P EST LOW 20 MIN: CPT

## 2022-02-01 NOTE — REASON FOR VISIT
[FreeTextEntry1] : 51 y/o with HTN, dizziness glucose 110.\par Seen in ER Oct 25, 2018.  BP up, CT head (-). Sugar mildly elevated in ER, diet poor\par \par EKG: NSR, normal axis and intervals, no ST-Tw abnormalities. 11/30/18\par \par Labs reveal  and microscopic hematuria\par \par 9/27/19- getting spasms in the hands, has left ankle pain. BP up, not taking Norvasc\par 9/23/20  On Sept 3rd he ate out in a restaurant, that night got RUQ pain. This recurred x1. \par Nocturia x 2 and urgency reported.\par 11/10/21 Last here June.  Has not been vaccinated for Covid, long discussion \par 2/1/22  Having Cysto 2/15/22  Labs are good

## 2022-02-01 NOTE — ASSESSMENT
[FreeTextEntry1] : 50 y/o male with microscopic hematuria and LUTS. remote history of nephrolithiasis\par Reviewed CT urogram from today\par 6mm left renal calculus\par Reviewed options including observation, ESWL, ureteroscopy\par Discussed risks/benefits of each option including stone clearance rates of ESWL vs URS\par He is interested in proceeding with URS, laser lithotripsy\par Will defer cysto for OR\par Medical clearance\par

## 2022-02-01 NOTE — HISTORY OF PRESENT ILLNESS
[FreeTextEntry1] : 50 y/o male with HTN and h/o nephrolithiasis  presenting for f/u of microscopic hematuria (7 RBC) found on PCP routine labs 11/10. He's had this in the past. No recent imaging. \par Endorses frequency, urgency, nocturia x2.\par Denies straining, PVD. Feels as though he empties his bladder. \par Denies gross hematuria, dysuria, flank pain\par Was prescribed Tamsulosin by PCP but not taking at this time\par \par Hx of nephrolithiasis \par - 1st stone 5+, years ago\par - 2nd stone - about 5 years treated with lithotripsy + stents\par \par Denies FM of  malignancies\par non smoker\par \par 1/31/22 Underwent CT urogram earlier today which shows 6mm non obstructing left kidney stone. Was scheduled for cystoscopy today. He is asymptomatic. \par  [Hematuria - Microscopic] : microscopic hematuria [None] : None

## 2022-02-01 NOTE — ASSESSMENT
[FreeTextEntry1] : PreOp- Pt cleared for Urologic surgery with Dr Cartagena.  Labs excellent, EKG NL, CXR ordered.\par \par Health Maintainence- Colonoscopy done by Dr Chanel 1/2019, PSA and GUTIERREZ 11/30/18, PSA NL but microscopic hematuria,   He did not go. \par \par HTN-  BP good on Amlodipine. Not taking it. Pt to resume, admonished.  BP up slightly, adding Dyazide weekly\par \par Elevated Glucose- diet reviewed, A1c sent, 5.3\par \par HLD- , diet, wt loss reviewed.  Needs to lose weight, labs in 4-6 mos.  Will treat if above 160.\par \par Vertigo- CT head (-) in the ER\par \par BPH- could not reach entire gland, PSA sent.  Urologist is now  Dr Cartagena  Flomax started by me 9/23/20, not taking\par \par Health Maintainence- Colonoscopy 1/2019 with Dr Chanel, PSA and GUTIERREZ 2018,saw  Urology

## 2022-02-02 LAB — BACTERIA UR CULT: NORMAL

## 2022-02-11 LAB — SARS-COV-2 N GENE NPH QL NAA+PROBE: NOT DETECTED

## 2022-02-14 ENCOUNTER — TRANSCRIPTION ENCOUNTER (OUTPATIENT)
Age: 52
End: 2022-02-14

## 2022-02-15 ENCOUNTER — RESULT REVIEW (OUTPATIENT)
Age: 52
End: 2022-02-15

## 2022-02-15 ENCOUNTER — APPOINTMENT (OUTPATIENT)
Dept: UROLOGY | Facility: AMBULATORY SURGERY CENTER | Age: 52
End: 2022-02-15

## 2022-02-15 ENCOUNTER — OUTPATIENT (OUTPATIENT)
Dept: OUTPATIENT SERVICES | Facility: HOSPITAL | Age: 52
LOS: 1 days | Discharge: HOME | End: 2022-02-15
Payer: COMMERCIAL

## 2022-02-15 PROCEDURE — 52356 CYSTO/URETERO W/LITHOTRIPSY: CPT | Mod: LT

## 2022-02-15 PROCEDURE — 88300 SURGICAL PATH GROSS: CPT | Mod: 26

## 2022-02-15 DEVICE — LASER FIBER FLEXIVA TRACTIP 200: Type: IMPLANTABLE DEVICE | Site: LEFT | Status: FUNCTIONAL

## 2022-02-15 DEVICE — URETERAL CATH DUAL LUMEN 10FR 54CM: Type: IMPLANTABLE DEVICE | Site: LEFT | Status: FUNCTIONAL

## 2022-02-15 DEVICE — URETERAL CATH OPEN END 5FR 70CM: Type: IMPLANTABLE DEVICE | Site: LEFT | Status: FUNCTIONAL

## 2022-02-15 DEVICE — GUIDEWIRE SENSOR DUAL-FLEX NITINOL STRAIGHT .035" X 150CM: Type: IMPLANTABLE DEVICE | Site: LEFT | Status: FUNCTIONAL

## 2022-02-15 DEVICE — URETEROSCOPE LITHOVUE DISP: Type: IMPLANTABLE DEVICE | Site: LEFT | Status: FUNCTIONAL

## 2022-02-15 DEVICE — LASER FIBER SOLTIVE 200 BALL TIP: Type: IMPLANTABLE DEVICE | Site: LEFT | Status: FUNCTIONAL

## 2022-02-15 DEVICE — STONE BASKET ZEROTIP NITINOL 4-WIRE 1.9FR 120CM X 12MM: Type: IMPLANTABLE DEVICE | Site: LEFT | Status: FUNCTIONAL

## 2022-02-15 DEVICE — URETERAL SHEATH NAVIGATOR HD 11/13FR X 46CM: Type: IMPLANTABLE DEVICE | Site: LEFT | Status: FUNCTIONAL

## 2022-02-15 RX ORDER — PHENAZOPYRIDINE HCL 100 MG
1 TABLET ORAL
Qty: 9 | Refills: 0
Start: 2022-02-15 | End: 2022-02-17

## 2022-02-16 ENCOUNTER — NON-APPOINTMENT (OUTPATIENT)
Age: 52
End: 2022-02-16

## 2022-02-18 LAB
CULTURE RESULTS: SIGNIFICANT CHANGE UP
SPECIMEN SOURCE: SIGNIFICANT CHANGE UP

## 2022-02-22 LAB — SURGICAL PATHOLOGY STUDY: SIGNIFICANT CHANGE UP

## 2022-02-25 LAB — NIDUS STONE QN: SIGNIFICANT CHANGE UP

## 2022-03-02 ENCOUNTER — APPOINTMENT (OUTPATIENT)
Dept: UROLOGY | Facility: CLINIC | Age: 52
End: 2022-03-02
Payer: COMMERCIAL

## 2022-03-02 VITALS — SYSTOLIC BLOOD PRESSURE: 124 MMHG | HEART RATE: 82 BPM | DIASTOLIC BLOOD PRESSURE: 89 MMHG | TEMPERATURE: 97.6 F

## 2022-03-02 PROCEDURE — 52310 CYSTOSCOPY AND TREATMENT: CPT

## 2022-03-15 ENCOUNTER — RX RENEWAL (OUTPATIENT)
Age: 52
End: 2022-03-15

## 2022-03-28 ENCOUNTER — RX RENEWAL (OUTPATIENT)
Age: 52
End: 2022-03-28

## 2022-04-18 ENCOUNTER — APPOINTMENT (OUTPATIENT)
Dept: OTOLARYNGOLOGY | Facility: CLINIC | Age: 52
End: 2022-04-18
Payer: COMMERCIAL

## 2022-04-18 ENCOUNTER — APPOINTMENT (OUTPATIENT)
Dept: UROLOGY | Facility: CLINIC | Age: 52
End: 2022-04-18
Payer: COMMERCIAL

## 2022-04-18 VITALS — SYSTOLIC BLOOD PRESSURE: 150 MMHG | TEMPERATURE: 97.3 F | HEART RATE: 55 BPM | DIASTOLIC BLOOD PRESSURE: 85 MMHG

## 2022-04-18 VITALS
HEIGHT: 68 IN | OXYGEN SATURATION: 97 % | SYSTOLIC BLOOD PRESSURE: 124 MMHG | BODY MASS INDEX: 32.43 KG/M2 | HEART RATE: 68 BPM | TEMPERATURE: 97.7 F | DIASTOLIC BLOOD PRESSURE: 80 MMHG | WEIGHT: 214 LBS

## 2022-04-18 DIAGNOSIS — J30.9 ALLERGIC RHINITIS, UNSPECIFIED: ICD-10-CM

## 2022-04-18 DIAGNOSIS — R09.81 NASAL CONGESTION: ICD-10-CM

## 2022-04-18 DIAGNOSIS — J34.89 OTHER SPECIFIED DISORDERS OF NOSE AND NASAL SINUSES: ICD-10-CM

## 2022-04-18 DIAGNOSIS — N20.0 CALCULUS OF KIDNEY: ICD-10-CM

## 2022-04-18 PROCEDURE — 31231 NASAL ENDOSCOPY DX: CPT

## 2022-04-18 PROCEDURE — 76775 US EXAM ABDO BACK WALL LIM: CPT

## 2022-04-18 PROCEDURE — 99213 OFFICE O/P EST LOW 20 MIN: CPT | Mod: 25

## 2022-04-18 PROCEDURE — 99203 OFFICE O/P NEW LOW 30 MIN: CPT | Mod: 25

## 2022-04-18 RX ORDER — TAMSULOSIN HYDROCHLORIDE 0.4 MG/1
0.4 CAPSULE ORAL
Qty: 90 | Refills: 3 | Status: DISCONTINUED | COMMUNITY
Start: 2020-09-23 | End: 2022-04-18

## 2022-04-18 NOTE — REASON FOR VISIT
[Initial Consultation] : an initial consultation for [FreeTextEntry2] : nasal congestion, blood tinged drainage

## 2022-04-18 NOTE — ASSESSMENT
[FreeTextEntry1] : 51 year old male with history of seasonal allergies presents with nasal dryness and nasal congestion, and blood tinged crusting/draiange. On exam, there was evidence of bilateral turbinate hypertrophy consistent with allergic rhinitis but otherwise no abnormal findings. For his nasal dryness/congestion and allergic rhinitis, we recommend nasal saline rinse and nasal steroid. In addition, we discussed use of a humidifier, bacitracin to the nares and the importance of nasal moisturization. We have provided out epistaxis handout that has details regarding nasal moisturization which we have reviewed with the patient. He will follow up as needed. \par \par Plan:\par - epistaxis handout reviewed and provided\par - nasal saline rinse and nasal steroid\par - humidifier, nasal moisturization, bacitracin to the nares\par - fu as needed

## 2022-04-18 NOTE — PHYSICAL EXAM
[Nasal Endoscopy Performed] : nasal endoscopy was performed, see procedure section for findings [Normal] : mucosa is normal [Midline] : trachea located in midline position [de-identified] : hypertrophied bilaterally

## 2022-04-18 NOTE — HISTORY OF PRESENT ILLNESS
[de-identified] : 4/18/22\par 51M with hx of seasonal allergies presents with left sided nasal congestion and blood tinged drainage when he blows his nose x 2 months. This will happen 1-2 x per day. No nosebleeds. No changes in sense of taste or smell. No facial pain/pressure. He's in the process of getting a root canal done but no other dental pain. No fevers or recent URI. He's been using Flonase since 2 weeks ago and feels like his symptoms are slightly improved. He stared to experience itchy eyes and has been using Zyrtec and other antihistamines with improvement. No other ENT issues at this time.

## 2022-04-18 NOTE — PROCEDURE
[FreeTextEntry6] : -\par Procedure Note\par   \par Pre-operative Diagnosis: nasal congestion, epistaxis\par Post-operative Diagnosis: allergic rhinitis, otherwise normal exam\par Anesthesia: Topical\par Procedure: Bilateral nasal endoscopy\par   \par Procedure Details: \par After topical anesthesia and decongestant, the patient was placed in the supine position. The telescope was passed along the left nasal floor to the nasopharynx. It was then passed into the region of the middle meatus, middle turbinate, and the sphenoethmoid region.  An identical procedure was performed on the right side. \par   \par Findings: \par Mucosa: 	                normal	\par Nasal septum: 	midline 	\par Discharge: 	none	\par Turbinates: 	hypertrophied bilaterally\par Adenoid: 	                normal	\par Posterior choanae: 	normal	\par Eustachian tubes: 	normal	\par Mucous stranding: 	normal 	\par Lesions: 	                Not present	\par   \par Comments: \par Condition: Stable. Patient tolerated procedure well.\par Complications: None\par \par

## 2022-04-19 NOTE — ASSESSMENT
[FreeTextEntry1] : 52 y/o male with 6mm left renal calculus s/p URS, lithotripsy 2/2022\par Reviewed stone composition: 100% Ca Ox\par Reviewed dietary recommendations\par US today reveals no residual stone\par F/u 1 year

## 2022-04-19 NOTE — HISTORY OF PRESENT ILLNESS
[FreeTextEntry1] : 52 y/o male with HTN and h/o nephrolithiasis  presenting for f/u of microscopic hematuria (7 RBC) found on PCP routine labs 11/10. He's had this in the past. No recent imaging. \par Endorses frequency, urgency, nocturia x2.\par Denies straining, PVD. Feels as though he empties his bladder. \par Denies gross hematuria, dysuria, flank pain\par Was prescribed Tamsulosin by PCP but not taking at this time\par \par Hx of nephrolithiasis \par - 1st stone 5+, years ago\par - 2nd stone - about 5 years treated with lithotripsy + stents\par \par Denies FM of  malignancies\par non smoker\par \par 1/31/22 Underwent CT urogram earlier today which shows 6mm non obstructing left kidney stone. Was scheduled for cystoscopy today. He is asymptomatic. \par \par 4/18/22 s/p left ureteroscopy, laser lithotripsy for left renal stone. Here for postop US. Stone composition: 100% Ca Ox. No current complaints. \par  [None] : None

## 2022-06-13 ENCOUNTER — APPOINTMENT (OUTPATIENT)
Dept: ORTHOPEDIC SURGERY | Facility: CLINIC | Age: 52
End: 2022-06-13

## 2022-10-17 ENCOUNTER — LABORATORY RESULT (OUTPATIENT)
Age: 52
End: 2022-10-17

## 2022-10-19 ENCOUNTER — APPOINTMENT (OUTPATIENT)
Dept: HEART AND VASCULAR | Facility: CLINIC | Age: 52
End: 2022-10-19

## 2022-10-19 VITALS — DIASTOLIC BLOOD PRESSURE: 84 MMHG | SYSTOLIC BLOOD PRESSURE: 132 MMHG

## 2022-10-19 VITALS
HEART RATE: 70 BPM | WEIGHT: 215 LBS | OXYGEN SATURATION: 99 % | TEMPERATURE: 99.2 F | HEIGHT: 68 IN | SYSTOLIC BLOOD PRESSURE: 144 MMHG | DIASTOLIC BLOOD PRESSURE: 94 MMHG | BODY MASS INDEX: 32.58 KG/M2

## 2022-10-19 PROCEDURE — 99213 OFFICE O/P EST LOW 20 MIN: CPT

## 2022-10-19 NOTE — REVIEW OF SYSTEMS
[Negative] : Heme/Lymph
No. JOSE GUADALUPE screening performed.  STOP BANG Legend: 0-2 = LOW Risk; 3-4 = INTERMEDIATE Risk; 5-8 = HIGH Risk

## 2022-10-19 NOTE — PHYSICAL EXAM
[General Appearance - Well Developed] : well developed [Normal Appearance] : normal appearance [Well Groomed] : well groomed [General Appearance - Well Nourished] : well nourished [No Deformities] : no deformities [General Appearance - In No Acute Distress] : no acute distress [Normal Conjunctiva] : the conjunctiva exhibited no abnormalities [Eyelids - No Xanthelasma] : the eyelids demonstrated no xanthelasmas [Respiration, Rhythm And Depth] : normal respiratory rhythm and effort [Exaggerated Use Of Accessory Muscles For Inspiration] : no accessory muscle use [Auscultation Breath Sounds / Voice Sounds] : lungs were clear to auscultation bilaterally [Heart Rate And Rhythm] : heart rate and rhythm were normal [Murmurs] : no murmurs present [Heart Sounds] : normal S1 and S2 [Abdomen Soft] : soft [Abdomen Tenderness] : non-tender [Abdomen Mass (___ Cm)] : no abdominal mass palpated [Abnormal Walk] : normal gait [Gait - Sufficient For Exercise Testing] : the gait was sufficient for exercise testing [Nail Clubbing] : no clubbing of the fingernails [Cyanosis, Localized] : no localized cyanosis [Petechial Hemorrhages (___cm)] : no petechial hemorrhages [Skin Color & Pigmentation] : normal skin color and pigmentation [] : no rash [No Venous Stasis] : no venous stasis [No Skin Ulcers] : no skin ulcer [No Xanthoma] : no  xanthoma was observed [Oriented To Time, Place, And Person] : oriented to person, place, and time [Affect] : the affect was normal [Mood] : the mood was normal [No Anxiety] : not feeling anxious [FreeTextEntry1] : old burns

## 2022-10-19 NOTE — ASSESSMENT
[FreeTextEntry1] : HTN-  BP good on Amlodipine. Not taking it. Pt to resume, admonished.  BP up slightly, adding Dyazide M & F\par \par Elevated Glucose- diet reviewed, A1c sent, 5.3\par \par HLD- , diet, wt loss reviewed.  Needs to lose weight, labs in 4-6 mos.  Will treat if above 160. 137\par \par Health Maintainence- Colonoscopy done by Dr Chanel 1/2019, PSA and GUTIERREZ 11/30/18, PSA NL but microscopic hematuria,   PSA 11/2021.  No flu or Covid shots!!!!.\par \par Vertigo- CT head (-) in the ER\par \par BPH- could not reach entire gland, PSA sent.  Urologist is now  Dr Cartagena  Flomax started by me 9/23/20, not taking\par \par PreOp- Pt cleared for Urologic surgery with Dr Cartagena.  Labs excellent, EKG NL, CXR ordered.

## 2022-10-19 NOTE — REASON FOR VISIT
[FreeTextEntry1] : 51 y/o with HTN, dizziness glucose 110.\par Seen in ER Oct 25, 2018.  BP up, CT head (-). Sugar mildly elevated in ER, diet poor\par \par EKG: NSR, normal axis and intervals, no ST-Tw abnormalities. 11/30/18\par \par Labs reveal  and microscopic hematuria\par \par 9/27/19- getting spasms in the hands, has left ankle pain. BP up, not taking Norvasc\par 9/23/20  On Sept 3rd he ate out in a restaurant, that night got RUQ pain. This recurred x1. \par Nocturia x 2 and urgency reported.\par 11/10/21 Last here June.  Has not been vaccinated for Covid, long discussion \par 2/1/22  Having Cysto 2/15/22  Labs are good\par 10/19/22

## 2023-02-22 ENCOUNTER — APPOINTMENT (OUTPATIENT)
Dept: HEART AND VASCULAR | Facility: CLINIC | Age: 53
End: 2023-02-22

## 2023-04-28 ENCOUNTER — NON-APPOINTMENT (OUTPATIENT)
Age: 53
End: 2023-04-28

## 2023-05-10 ENCOUNTER — APPOINTMENT (OUTPATIENT)
Dept: ORTHOPEDIC SURGERY | Facility: CLINIC | Age: 53
End: 2023-05-10
Payer: OTHER MISCELLANEOUS

## 2023-05-10 VITALS — BODY MASS INDEX: 32.58 KG/M2 | WEIGHT: 215 LBS | HEIGHT: 68 IN

## 2023-05-10 DIAGNOSIS — M67.951 UNSPECIFIED DISORDER OF SYNOVIUM AND TENDON, RIGHT THIGH: ICD-10-CM

## 2023-05-10 DIAGNOSIS — M76.01 GLUTEAL TENDINITIS, RIGHT HIP: ICD-10-CM

## 2023-05-10 PROCEDURE — 73502 X-RAY EXAM HIP UNI 2-3 VIEWS: CPT | Mod: RT

## 2023-05-10 PROCEDURE — 73560 X-RAY EXAM OF KNEE 1 OR 2: CPT | Mod: RT

## 2023-05-10 PROCEDURE — 99213 OFFICE O/P EST LOW 20 MIN: CPT

## 2023-05-10 RX ORDER — FLUTICASONE PROPIONATE 50 UG/1
50 SPRAY, METERED NASAL DAILY
Qty: 16 | Refills: 1 | Status: COMPLETED | COMMUNITY
Start: 2021-06-07 | End: 2023-05-10

## 2023-05-10 RX ORDER — AMOXICILLIN 500 MG/1
500 TABLET, FILM COATED ORAL
Qty: 28 | Refills: 0 | Status: COMPLETED | COMMUNITY
Start: 2022-05-26 | End: 2023-05-10

## 2023-05-10 RX ORDER — AZITHROMYCIN 250 MG/1
250 TABLET, FILM COATED ORAL
Qty: 6 | Refills: 0 | Status: COMPLETED | COMMUNITY
Start: 2022-05-19 | End: 2023-05-10

## 2023-05-10 RX ORDER — MECLIZINE HYDROCHLORIDE 25 MG/1
25 TABLET ORAL
Qty: 90 | Refills: 1 | Status: COMPLETED | COMMUNITY
Start: 2022-03-15 | End: 2023-05-10

## 2023-05-10 RX ORDER — FEXOFENADINE HCL 60 MG/1
60 TABLET, FILM COATED ORAL TWICE DAILY
Qty: 180 | Refills: 0 | Status: COMPLETED | COMMUNITY
Start: 2021-06-07 | End: 2023-05-10

## 2023-05-10 NOTE — REASON FOR VISIT
[Knee Injury] : knee injury [Workers' Comp: Date of Injury: _______] : This visit is related to worker's compensation. Date of Injury: [unfilled] [FreeTextEntry2] : RIGHT thigh

## 2023-05-10 NOTE — HISTORY OF PRESENT ILLNESS
[de-identified] : Mr. Pandya is now 53-year-old gentleman who I had treated for a right medial knee contusion 2 years ago comes in today for a new injury that occurred at work on 2/20/2023. He works for the [employer] and was working on elevated tracks when he slipped causing him to fall down between the planks of wood hitting his RIGHT side from knee to hip.  He had bruising and swelling greatest over his lateral thigh near the hip and medial knee slightly.  He went to [facility]. He was given methocarbamol 500mg and ibuprofen 800mg when he has been taking. He rates knee pain at a 3/10 and hip pain is 7-8/10.\par He had RIGHT hip MRI in April and RIGHT knee MRI in February. He has done 4 PT sessions so far but will be switching to a new location next week.\par He has not been working since the injury.\par Yesterday he had left low back pain and today he woke up with right low back pain.  Prior to the last couple days he has not been having back pain with the injury

## 2023-05-10 NOTE — ASSESSMENT
[FreeTextEntry1] : 53-year-old right hip and knee pain that started after working injury 2-1/2 months ago.  There is no evidence of any acute injury at this time but he has ongoing pain in these areas and now has low back pain in the last couple days which may or may not be related indirectly.\par He has not been able to work because of pain.  He is limping.\par I had treated him for another work injury to the right knee several years ago from a similar type of injury and contusion which had gotten better.  The knee is stable and MRI did not show any tears or acute injury.\par In his hip there is no real acute injury either although there is a labral tear which may be acute or could be degenerative.  I recommended physical therapy for his hip and knee and he can take the ibuprofen as needed.  I recommended he see a hip specialist since I do not do surgery for labral tears.  Often these may be treated nonoperatively particularly if they are degenerative.  If they do cause ongoing symptoms then surgery with repair can be performed.\par Steroid injection in the hip may be considered for treatment and for diagnostic purposes as well sometimes and if he is up in the ongoing pain we may try that.\par Right now he cannot work due to pain with 100% temporary impairment.  He states that there is no desk work alternative for him.\par Follow-up in 4 weeks.

## 2023-05-10 NOTE — PHYSICAL EXAM
[LE] : Sensory: Intact in bilateral lower extremities [Normal RLE] : Right Lower Extremity: No scars, rashes, lesions, ulcers, skin intact [Normal LLE] : Left Lower Extremity: No scars, rashes, lesions, ulcers, skin intact [Normal Touch] : sensation intact for touch [Normal] : No swelling, no edema, normal pedal pulses and normal temperature [de-identified] : Right hip\par He appears uncomfortable changing position.\par No edema, ecchymoses, erythema.\par Tender just inferior to the superior iliac crest over the gluteal tendon origin and mildly at the right greater trochanter and lateral proximal thigh.\par No significant lumbar tenderness.\par 5-/5 hip abduction strength bilaterally with mild pain on the right.\par Straight leg raises to about 75 degrees with tight hamstrings.\par Right hip range of motion is with about 130 degrees flexion, 15 degrees internal rotation and 45 degrees of external rotation with pain at extremes of motion including flexion, internal rotation and external rotation on the right.\par Normal neurovascular exam distally.\par \par Right knee\par No edema, ecchymoses, erythema, effusion.  Skin is intact.\par Knee range of motion 0 to 130 degrees with some pain on full flexion.\par Tender over the medial side of his knee.  Old scar from prior injury.\par Normal varus and valgus laxity.  Negative anterior and posterior drawer.  Ia Lachman.  Negative Abdiel.\par Intact extensor mechanism.\par Normal neurovascular exam [de-identified] : \par \par X-rays taken today of the right hip AP and lateral views show mild productive changes in the lateral acetabulum/hip and there may be some mild joint space narrowing consistent with early degenerative changes.  There is increased bone over the head neck junction that may be consistent with cam lesion or impingement.\par \par He apparently had x-rays in the emergency room of the right knee which were negative.  I did a 45 degree PA flexed view today and there may be minimal medial joint space narrowing but otherwise no arthritis changes seen.\par \par MRI of RIGHT hip on 4/12/23 showed hamstrings on the RIGHT tendinosis/tendinopathy with diffuse intrasubstance signal abnormality and thickening approaching the  proximal insertion on the ischial tuberosity. Tendinosis/tendinopathy of the gluteus medius on the RIGHT which demonstrates heterogeneous intrasubstance signal abnormality approaching the trochanteric insertion. Slight narrowing of the superior RIGHT hip joint and there is a tear of the superior acetabular labrum on the RIGHT, which is diffusely irregular in appearance. There is trace fluid within the joint space. \par \par MRI of RIGHT knee on 2/27/23 showed marginated oval shaped lesion within the midline of the distal femoral metadiaphysis which demonstrates no aggressive features and has an appearance most consistent with enchondroma. Fraying of MCL which demonstrates heterogeneous intrasubstance signal abnormality and thickening  approaching the proximal insertion of the medial femoral condyle. Lateral patellar tilt and patellofemoral articular cartilage. Insertional tendinosis of the distal quadriceps tendon. Synovial effusion is noted in the joint.

## 2023-05-25 ENCOUNTER — APPOINTMENT (OUTPATIENT)
Dept: ORTHOPEDIC SURGERY | Facility: CLINIC | Age: 53
End: 2023-05-25
Payer: COMMERCIAL

## 2023-05-25 DIAGNOSIS — M16.12 UNILATERAL PRIMARY OSTEOARTHRITIS, LEFT HIP: ICD-10-CM

## 2023-05-25 PROCEDURE — 99214 OFFICE O/P EST MOD 30 MIN: CPT

## 2023-05-25 PROCEDURE — 73502 X-RAY EXAM HIP UNI 2-3 VIEWS: CPT | Mod: LT

## 2023-05-25 PROCEDURE — 72100 X-RAY EXAM L-S SPINE 2/3 VWS: CPT

## 2023-05-25 NOTE — PHYSICAL EXAM
[LE] : Sensory: Intact in bilateral lower extremities [Normal RLE] : Right Lower Extremity: No scars, rashes, lesions, ulcers, skin intact [Normal LLE] : Left Lower Extremity: No scars, rashes, lesions, ulcers, skin intact [Normal Touch] : sensation intact for touch [Normal] : No swelling, no edema, normal pedal pulses and normal temperature [de-identified] : Low back and left hip\par He appears uncomfortable changing position.\par No edema, ecchymoses, erythema.\par Mild tenderness left low back and buttock.\par Normal gait.  He can walk on his heels and toes but with some sharp jabs of pain in his low back.\par Forward flexion lumbar spine he reaches to about his ankles.\par Straight leg raises to approximately 75 degrees with tight hamstrings \par 5-/5 hip abduction strength bilaterally with mild pain on the right.\par Left hip range of motion is with about 130 degrees flexion, 10 degrees internal rotation and 35 degrees of external rotation with discomfort at extremes of motion including flexion, internal rotation and external rotation on the right.\par Normal neurovascular exam distally. [de-identified] : \par X-rays taken today of the left hip AP and lateral views show mild productive changes in the lateral acetabulum/hip and there may be some mild joint space narrowing consistent with early degenerative changes.  There is increased bone over the head neck junction that may be consistent with cam lesion or impingement.\par \par X-rays of the lumbar spine AP and lateral views show osteophytes vertebral body and facet arthrosis\par \par MRI of RIGHT hip on 4/12/23 showed hamstrings on the RIGHT tendinosis/tendinopathy with diffuse intrasubstance signal abnormality and thickening approaching the  proximal insertion on the ischial tuberosity. Tendinosis/tendinopathy of the gluteus medius on the RIGHT which demonstrates heterogeneous intrasubstance signal abnormality approaching the trochanteric insertion. Slight narrowing of the superior RIGHT hip joint and there is a tear of the superior acetabular labrum on the RIGHT, which is diffusely irregular in appearance. There is trace fluid within the joint space. \par \par MRI of RIGHT knee on 2/27/23 showed marginated oval shaped lesion within the midline of the distal femoral metadiaphysis which demonstrates no aggressive features and has an appearance most consistent with enchondroma. Fraying of MCL which demonstrates heterogeneous intrasubstance signal abnormality and thickening  approaching the proximal insertion of the medial femoral condyle. Lateral patellar tilt and patellofemoral articular cartilage. Insertional tendinosis of the distal quadriceps tendon. Synovial effusion is noted in the joint.

## 2023-05-25 NOTE — HISTORY OF PRESENT ILLNESS
[de-identified] : 53-year-old gentleman who comes in for new pain left back and hip area.  I had seen him recently for right hip pain which is part of a Worker's Compensation claim.\par About 2 weeks ago he developed pain that is constant in the left low back and buttock and can radiate to the hip and thigh area.  Pain occurs sitting and standing and walking.  Its about 5-6 out of 10.  He has been taking ibuprofen 600 to 800 mg twice a day and methocarbamol and lidocaine patch.  Pain is ongoing.  No numbness or tingling or weakness in the leg.  Lifting is painful.\par He states that he has not had prior back and hip issues

## 2023-05-25 NOTE — ASSESSMENT
[FreeTextEntry1] : 53-year-old with left low back and left hip pain going on for several weeks not alleviated by anti-inflammatories, muscle relaxants and patches.  He states that he is very uncomfortable and the pain is constant.\par He has some mild hip arthritis and there is lumbar spondylosis.  He could have a herniated disc contributing to the pain.  I referred him to physical therapy.  MRI was ordered to rule out nerve impingement in which case we may try steroids either oral or epidural given the ongoing significant pain.\par I will have him stop the high-dose ibuprofen and try Naprosyn 500 mg twice daily.  He can take Tylenol 2 tablets twice a day in addition and continue with lidocaine patch and the muscle relaxant as needed.  He is not working right now because of the work-related injury.\par I will call him with MRI results\par Follow-up in about 4 to 6 weeks for

## 2023-05-31 ENCOUNTER — OUTPATIENT (OUTPATIENT)
Dept: OUTPATIENT SERVICES | Facility: HOSPITAL | Age: 53
LOS: 1 days | End: 2023-05-31
Payer: OTHER MISCELLANEOUS

## 2023-05-31 PROCEDURE — 73502 X-RAY EXAM HIP UNI 2-3 VIEWS: CPT

## 2023-05-31 PROCEDURE — 73502 X-RAY EXAM HIP UNI 2-3 VIEWS: CPT | Mod: 26,RT

## 2023-06-14 ENCOUNTER — NON-APPOINTMENT (OUTPATIENT)
Age: 53
End: 2023-06-14

## 2023-06-14 ENCOUNTER — APPOINTMENT (OUTPATIENT)
Dept: HEART AND VASCULAR | Facility: CLINIC | Age: 53
End: 2023-06-14
Payer: COMMERCIAL

## 2023-06-14 ENCOUNTER — APPOINTMENT (OUTPATIENT)
Dept: ORTHOPEDIC SURGERY | Facility: CLINIC | Age: 53
End: 2023-06-14
Payer: OTHER MISCELLANEOUS

## 2023-06-14 ENCOUNTER — LABORATORY RESULT (OUTPATIENT)
Age: 53
End: 2023-06-14

## 2023-06-14 VITALS
HEIGHT: 68 IN | OXYGEN SATURATION: 99 % | SYSTOLIC BLOOD PRESSURE: 126 MMHG | TEMPERATURE: 99.6 F | HEART RATE: 73 BPM | DIASTOLIC BLOOD PRESSURE: 77 MMHG | BODY MASS INDEX: 33.34 KG/M2 | WEIGHT: 220 LBS

## 2023-06-14 DIAGNOSIS — M25.471 EFFUSION, RIGHT ANKLE: ICD-10-CM

## 2023-06-14 DIAGNOSIS — M25.472 EFFUSION, RIGHT ANKLE: ICD-10-CM

## 2023-06-14 PROCEDURE — 99214 OFFICE O/P EST MOD 30 MIN: CPT | Mod: 25

## 2023-06-14 PROCEDURE — 36415 COLL VENOUS BLD VENIPUNCTURE: CPT

## 2023-06-14 PROCEDURE — 93000 ELECTROCARDIOGRAM COMPLETE: CPT

## 2023-06-14 NOTE — REASON FOR VISIT
[FreeTextEntry1] : 49 y/o with HTN, dizziness glucose 110.\par Seen in ER Oct 25, 2018.  BP up, CT head (-). Sugar mildly elevated in ER, diet poor\par \par EKG: NSR, normal axis and intervals, no ST-Tw abnormalities. 11/30/18\par \par Labs reveal  and microscopic hematuria\par \par 9/27/19- getting spasms in the hands, has left ankle pain. BP up, not taking Norvasc\par 9/23/20  On Sept 3rd he ate out in a restaurant, that night got RUQ pain. This recurred x1. \par Nocturia x 2 and urgency reported.\par 11/10/21 Last here June.  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  + right Pelvic and hip/lower back pain. Saw Dr Naty Dao.  Pain has spread to left side.   MRI of right knee with fraying MCL. MRI right hip with tendinosis, labrum tear.  Right ankle edema.  On Methocarbimol

## 2023-06-14 NOTE — ASSESSMENT
[FreeTextEntry1] : HTN-  BP good on Amlodipine. Not taking it. Pt to resume, admonished.  BP up slightly, adding Dyazide M & F\par \par Elevated Glucose- diet reviewed, A1c sent, 5.3\par \par HLD- , diet, wt loss reviewed.  Needs to lose weight, labs in 4-6 mos.  Will treat if above 160. 137,  Labs were drawn today in Office. \par \par Health Maintainence- Colonoscopy done by Dr Chanel 1/2019, PSA and GUTIERREZ 11/30/18, PSA NL but microscopic hematuria,   PSA 11/2021.  No flu or Covid shots!!!!.\par \par Vertigo- CT head (-) in the ER\par \par BPH- could not reach entire gland, PSA sent.  Urologist is now  Dr Cartagena  Flomax started by me 9/23/20, not taking\par \par PreOp- Pt cleared for Urologic surgery with Dr Cartagena.  Labs excellent, EKG NL, CXR ordered.

## 2023-06-15 LAB
ALBUMIN SERPL ELPH-MCNC: 4.5 G/DL
ALP BLD-CCNC: 67 U/L
ALT SERPL-CCNC: 22 U/L
ANION GAP SERPL CALC-SCNC: 11 MMOL/L
APPEARANCE: CLEAR
AST SERPL-CCNC: 24 U/L
BILIRUB SERPL-MCNC: 0.4 MG/DL
BILIRUBIN URINE: NEGATIVE
BLOOD URINE: ABNORMAL
BUN SERPL-MCNC: 17 MG/DL
CALCIUM SERPL-MCNC: 9.6 MG/DL
CHLORIDE SERPL-SCNC: 105 MMOL/L
CHOLEST SERPL-MCNC: 213 MG/DL
CO2 SERPL-SCNC: 25 MMOL/L
COLOR: YELLOW
CREAT SERPL-MCNC: 1.17 MG/DL
EGFR: 74 ML/MIN/1.73M2
ESTIMATED AVERAGE GLUCOSE: 100 MG/DL
GLUCOSE QUALITATIVE U: NEGATIVE MG/DL
GLUCOSE SERPL-MCNC: 89 MG/DL
HBA1C MFR BLD HPLC: 5.1 %
HDLC SERPL-MCNC: 48 MG/DL
KETONES URINE: NEGATIVE MG/DL
LDLC SERPL CALC-MCNC: 145 MG/DL
LEUKOCYTE ESTERASE URINE: NEGATIVE
NITRITE URINE: NEGATIVE
NONHDLC SERPL-MCNC: 165 MG/DL
PH URINE: 6.5
POTASSIUM SERPL-SCNC: 4.4 MMOL/L
PROT SERPL-MCNC: 7.3 G/DL
PROTEIN URINE: NEGATIVE MG/DL
SODIUM SERPL-SCNC: 141 MMOL/L
SPECIFIC GRAVITY URINE: 1.02
TRIGL SERPL-MCNC: 100 MG/DL
UROBILINOGEN URINE: 0.2 MG/DL

## 2023-06-28 ENCOUNTER — APPOINTMENT (OUTPATIENT)
Dept: ORTHOPEDIC SURGERY | Facility: CLINIC | Age: 53
End: 2023-06-28
Payer: OTHER MISCELLANEOUS

## 2023-06-28 DIAGNOSIS — M51.27 OTHER INTERVERTEBRAL DISC DISPLACEMENT, LUMBOSACRAL REGION: ICD-10-CM

## 2023-06-28 DIAGNOSIS — M54.42 LUMBAGO WITH SCIATICA, LEFT SIDE: ICD-10-CM

## 2023-06-28 PROCEDURE — 99213 OFFICE O/P EST LOW 20 MIN: CPT

## 2023-06-28 NOTE — PHYSICAL EXAM
[Normal] : Gait: normal [de-identified] : Low back and left hip\par He appears more comfortable today changing position more easily and moving better\par No edema, ecchymoses, erythema.\par No significant lumbar tenderness and minimally tender sciatic notch\par Normal gait.  He can walk on his heels and toes without difficulty.\par Forward flexion lumbar spine he reaches to about his ankles.\par Straight leg raises to approximately 75 degrees with tight hamstrings.  No radiculopathy\par 5-/5 hip abduction strength bilaterally with mild pain on the right.\par Left hip range of motion is with about 130 degrees flexion, 10 degrees internal rotation and 35 degrees of external rotation with discomfort at extremes of motion including flexion, internal rotation and external rotation on the right.\par Motor is 5/5 anterior tibial tendon, gastrocsoleus, peroneals, EHL.\par Sensation is intact lower extremities.\par Deep tendon reflexes are 1+ at the Achilles and trace at the patella bilaterally\par Normal neurovascular exam distally. [de-identified] : \par MRI of the lumbar spine was performed\par 06/2023 showing multilevel changes with a slight thoracolumbar dextroscoliosis.  At L1-L2 there are rounded foci like subchondral cyst at the right facet, and L2-L3 level there is a disc bulge and disc extrusions abutting the exiting right L2 nerve root and encroaching on the lateral recess.  At L3-L4 there is a disc bulging again.  This protrusion encroaching on exiting L3 nerve roots facet joints are hypertrophic.  At L4-L5 level there is a grade 1 anterolisthesis with disc bulge and disc protrusion with left foraminal disc extension encroaching on the left L4 nerve root and right disc herniation impinging the right L4 nerve root and facet arthrosis.  At L5-S1 there is a grade 1 retrolisthesis with disc bulge abutting L5 nerve roots right slightly greater than left and abutting the proximal S1 nerve roots left greater than right.  Facet arthrosis.  Central canal narrowing.\par Possible hemangiomas L2 and L3 vertebrae.\par Kidneys partially visualized.  Small cysts.  Ultrasound may be of value and I would defer to his internist.  Also bladder appears to contain fluid with thickened wall.\par \par X-rays taken 5/25/23 of the left hip AP and lateral views show mild productive changes in the lateral acetabulum/hip and there may be some mild joint space narrowing consistent with early degenerative changes. There is increased bone over the head neck junction that may be consistent with cam lesion or impingement.\par \par X-rays of the lumbar spine AP and lateral views show osteophytes vertebral body and facet arthrosis\par \par MRI of RIGHT hip on 4/12/23 showed hamstrings on the RIGHT tendinosis/tendinopathy with diffuse intrasubstance signal abnormality and thickening approaching the proximal insertion on the ischial tuberosity. Tendinosis/tendinopathy of the gluteus medius on the RIGHT which demonstrates heterogeneous intrasubstance signal abnormality approaching the trochanteric insertion. Slight narrowing of the superior RIGHT hip joint and there is a tear of the superior acetabular labrum on the RIGHT, which is diffusely irregular in appearance. There is trace fluid within the joint space. \par \par MRI of RIGHT knee on 2/27/23 showed marginated oval shaped lesion within the midline of the distal femoral metadiaphysis which demonstrates no aggressive features and has an appearance most consistent with enchondroma. Fraying of MCL which demonstrates heterogeneous intrasubstance signal abnormality and thickening approaching the proximal insertion of the medial femoral condyle. Lateral patellar tilt and patellofemoral articular cartilage. Insertional tendinosis of the distal quadriceps tendon. Synovial effusion is noted in the joint.

## 2023-06-28 NOTE — HISTORY OF PRESENT ILLNESS
[de-identified] : 53-year-old gentleman who comes in for follow up for LEFT hip and low back pain. He is feeling somewhat better. He was referred for physical therapy which he has started and its going well. He is pleased with his progress. He had MRI lumbar spine on 6/23/23  described below.\par 2 days ago he had a bad day with RIGHT side back pain but nothing currently. No numbness or tingling. No radiating pain down leg. He still has occasional bad days. He was with grandson so was lifting him up in which he had some discomfort. \par He feels like the PT is definitely helping and wants to continue.\par No numbness or tingling.  No radiating pain.  He does not feel ready to go back to work where he needs to do a lot of heavy lifting since that aggravates the pain

## 2023-06-28 NOTE — ASSESSMENT
[FreeTextEntry1] : 53-year-old with left low back and left hip pain going on for about 6 weeks initially not alleviated by anti-inflammatories, muscle relaxants and patches.\par The MRI showed multilevel changes with disc bulges, protrusions and variable degrees of nerve impingement and facet arthrosis.\par His symptoms seem much better and there are no neurologic deficits in terms of motor or sensation appreciated today.  Pain is much less.  He will continue with ibuprofen as needed or Tylenol and will continue with physical therapy.  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  He is also being treated for right knee and thigh contusion getting better gradually.

## 2023-06-29 ENCOUNTER — RESULT REVIEW (OUTPATIENT)
Age: 53
End: 2023-06-29

## 2023-06-29 DIAGNOSIS — Q61.02 CONGENITAL MULTIPLE RENAL CYSTS: ICD-10-CM

## 2023-07-03 ENCOUNTER — OUTPATIENT (OUTPATIENT)
Dept: OUTPATIENT SERVICES | Facility: HOSPITAL | Age: 53
LOS: 1 days | End: 2023-07-03
Payer: COMMERCIAL

## 2023-07-03 ENCOUNTER — APPOINTMENT (OUTPATIENT)
Dept: ULTRASOUND IMAGING | Facility: HOSPITAL | Age: 53
End: 2023-07-03

## 2023-07-03 PROCEDURE — 76770 US EXAM ABDO BACK WALL COMP: CPT

## 2023-07-03 PROCEDURE — 76770 US EXAM ABDO BACK WALL COMP: CPT | Mod: 26

## 2023-07-12 ENCOUNTER — APPOINTMENT (OUTPATIENT)
Dept: ORTHOPEDIC SURGERY | Facility: CLINIC | Age: 53
End: 2023-07-12
Payer: OTHER MISCELLANEOUS

## 2023-07-12 PROCEDURE — 99213 OFFICE O/P EST LOW 20 MIN: CPT

## 2023-07-12 NOTE — HISTORY OF PRESENT ILLNESS
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 He has been continuing with physical therapy which he feels is helping. He still has some pain in the back of his hip.  He gets some groin pain.  He is up to lifting 2 pounds of weights with straight leg raises.

## 2023-07-12 NOTE — ASSESSMENT
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  He will do a little more physical therapy to get him stronger for his job.  I think with 1 more round of therapy he should be good enough to resume work.  He should do a lot of home exercises as well.\par He will follow-up in 4 weeks.  Hopefully I can clear him to go back to work at that time.\par There is 33% temporary impairment at this time. He would be capable of light duty/ sedentary work

## 2023-07-12 NOTE — PHYSICAL EXAM
[LE] : Sensory: Intact in bilateral lower extremities [Normal RLE] : Right Lower Extremity: No scars, rashes, lesions, ulcers, skin intact [Normal LLE] : Left Lower Extremity: No scars, rashes, lesions, ulcers, skin intact [Normal Touch] : sensation intact for touch [Normal] : No swelling, no edema, normal pedal pulses and normal temperature [de-identified] : Right hip and knee\par Normal gait.\par No edema, ecchymoses, erythema.\par Right knee range of motion 0 to 130 degrees flexion without pain.\par Mildly tender medial knee where he has a small scar from prior injury a few years ago.\par Normal varus and valgus laxity.\par Negative anterior and posterior drawer.  Ia Lachman.\par Intact extensor mechanism/straight leg raise\par Hip range of motion is with about 130 degrees flexion and 10 to 20 degrees internal rotation and 40 degrees external rotation.  Mild pain with range of motion of the hip.\par Intact straight leg raise and hip abduction [de-identified] : \par MRI right knee 2/27/2023 showed enchondroma in the femur and fraying of the MCL and thickening that may be consistent with prior injury.  Lateral patella tilt and small joint effusion.\par MRI of the right hip 4/12/2023 showed tendinosis right hamstrings with thickening and tendinopathy involving the gluteus medius mild narrowing superior right hip joint with tear of right superior labral normal regularity.\par \par X-rays right hip showed mild productive changes on May 10, 2023 and small cam lesion.\par

## 2023-07-12 NOTE — REASON FOR VISIT
[Workers' Comp: Date of Injury: _______] : This visit is related to worker's compensation. Date of Injury: [unfilled] [FreeTextEntry2] : RIGHT hip/knee injury

## 2023-07-26 ENCOUNTER — FORM ENCOUNTER (OUTPATIENT)
Age: 53
End: 2023-07-26

## 2023-07-27 ENCOUNTER — NON-APPOINTMENT (OUTPATIENT)
Age: 53
End: 2023-07-27

## 2023-08-02 ENCOUNTER — APPOINTMENT (OUTPATIENT)
Dept: ORTHOPEDIC SURGERY | Facility: CLINIC | Age: 53
End: 2023-08-02

## 2023-08-16 ENCOUNTER — APPOINTMENT (OUTPATIENT)
Dept: ORTHOPEDIC SURGERY | Facility: CLINIC | Age: 53
End: 2023-08-16

## 2023-08-22 NOTE — HISTORY OF PRESENT ILLNESS
[de-identified] : 53-year-old gentleman who comes in for follow up for LEFT hip and low back pain. He is feeling somewhat better. He was referred for physical therapy which he has started and its going well. He is pleased with his progress.

## 2023-08-22 NOTE — PHYSICAL EXAM
[LE] : Sensory: Intact in bilateral lower extremities [Normal RLE] : Right Lower Extremity: No scars, rashes, lesions, ulcers, skin intact [Normal LLE] : Left Lower Extremity: No scars, rashes, lesions, ulcers, skin intact [Normal Touch] : sensation intact for touch [Normal] : Oriented to person, place, and time, insight and judgement were intact and the affect was normal [de-identified] : Low back and left hip\par He appears more comfortable today changing position more easily and moving better\par No edema, ecchymoses, erythema.\par No significant lumbar tenderness and minimally tender sciatic notch\par Normal gait. He can walk on his heels and toes without difficulty.\par Forward flexion lumbar spine he reaches to about his ankles.\par Straight leg raises to approximately 75 degrees with tight hamstrings. No radiculopathy\par 5-/5 hip abduction strength bilaterally with mild pain on the right.\par Left hip range of motion is with about 130 degrees flexion, 10 degrees internal rotation and 35 degrees of external rotation with discomfort at extremes of motion including flexion, internal rotation and external rotation on the right.\par Motor is 5/5 anterior tibial tendon, gastrocsoleus, peroneals, EHL.\par Sensation is intact lower extremities.\par Deep tendon reflexes are 1+ at the Achilles and trace at the patella bilaterally\par Normal neurovascular exam distally.  [de-identified] : \par MRI of the lumbar spine was performed\par 06/2023 showing multilevel changes with a slight thoracolumbar dextroscoliosis. At L1-L2 there are rounded foci like subchondral cyst at the right facet, and L2-L3 level there is a disc bulge and disc extrusions abutting the exiting right L2 nerve root and encroaching on the lateral recess. At L3-L4 there is a disc bulging again. This protrusion encroaching on exiting L3 nerve roots facet joints are hypertrophic. At L4-L5 level there is a grade 1 anterolisthesis with disc bulge and disc protrusion with left foraminal disc extension encroaching on the left L4 nerve root and right disc herniation impinging the right L4 nerve root and facet arthrosis. At L5-S1 there is a grade 1 retrolisthesis with disc bulge abutting L5 nerve roots right slightly greater than left and abutting the proximal S1 nerve roots left greater than right. Facet arthrosis. Central canal narrowing.\par Possible hemangiomas L2 and L3 vertebrae.\par Kidneys partially visualized. Small cysts. Ultrasound may be of value and I would defer to his internist. Also bladder appears to contain fluid with thickened wall.\par \par X-rays taken 5/25/23 of the left hip AP and lateral views show mild productive changes in the lateral acetabulum/hip and there may be some mild joint space narrowing consistent with early degenerative changes. There is increased bone over the head neck junction that may be consistent with cam lesion or impingement.\par \par X-rays of the lumbar spine AP and lateral views show osteophytes vertebral body and facet arthrosis\par \par MRI of RIGHT hip on 4/12/23 showed hamstrings on the RIGHT tendinosis/tendinopathy with diffuse intrasubstance signal abnormality and thickening approaching the proximal insertion on the ischial tuberosity. Tendinosis/tendinopathy of the gluteus medius on the RIGHT which demonstrates heterogeneous intrasubstance signal abnormality approaching the trochanteric insertion. Slight narrowing of the superior RIGHT hip joint and there is a tear of the superior acetabular labrum on the RIGHT, which is diffusely irregular in appearance. There is trace fluid within the joint space. \par \par MRI of RIGHT knee on 2/27/23 showed marginated oval shaped lesion within the midline of the distal femoral metadiaphysis which demonstrates no aggressive features and has an appearance most consistent with enchondroma. Fraying of MCL which demonstrates heterogeneous intrasubstance signal abnormality and thickening approaching the proximal insertion of the medial femoral condyle. Lateral patellar tilt and patellofemoral articular cartilage. Insertional tendinosis of the distal quadriceps tendon. Synovial effusion is noted in the joint.

## 2023-11-03 ENCOUNTER — APPOINTMENT (OUTPATIENT)
Dept: ORTHOPEDIC SURGERY | Facility: CLINIC | Age: 53
End: 2023-11-03
Payer: OTHER MISCELLANEOUS

## 2023-11-03 DIAGNOSIS — M16.11 UNILATERAL PRIMARY OSTEOARTHRITIS, RIGHT HIP: ICD-10-CM

## 2023-11-03 DIAGNOSIS — S70.11XA CONTUSION OF RIGHT THIGH, INITIAL ENCOUNTER: ICD-10-CM

## 2023-11-03 DIAGNOSIS — S73.191A OTHER SPRAIN OF RIGHT HIP, INITIAL ENCOUNTER: ICD-10-CM

## 2023-11-03 PROCEDURE — 73502 X-RAY EXAM HIP UNI 2-3 VIEWS: CPT

## 2023-11-03 PROCEDURE — 99213 OFFICE O/P EST LOW 20 MIN: CPT

## 2023-12-01 ENCOUNTER — NON-APPOINTMENT (OUTPATIENT)
Age: 53
End: 2023-12-01

## 2023-12-06 ENCOUNTER — APPOINTMENT (OUTPATIENT)
Dept: PHYSICAL MEDICINE AND REHAB | Facility: CLINIC | Age: 53
End: 2023-12-06

## 2023-12-11 ENCOUNTER — APPOINTMENT (OUTPATIENT)
Dept: ORTHOPEDIC SURGERY | Facility: CLINIC | Age: 53
End: 2023-12-11
Payer: OTHER MISCELLANEOUS

## 2023-12-11 DIAGNOSIS — M54.16 RADICULOPATHY, LUMBAR REGION: ICD-10-CM

## 2023-12-11 PROCEDURE — 99213 OFFICE O/P EST LOW 20 MIN: CPT

## 2023-12-18 ENCOUNTER — RX RENEWAL (OUTPATIENT)
Age: 53
End: 2023-12-18

## 2023-12-18 RX ORDER — AMLODIPINE BESYLATE 5 MG/1
5 TABLET ORAL
Qty: 90 | Refills: 3 | Status: ACTIVE | COMMUNITY
Start: 2020-02-03 | End: 1900-01-01

## 2023-12-21 ENCOUNTER — APPOINTMENT (OUTPATIENT)
Dept: PHYSICAL MEDICINE AND REHAB | Facility: CLINIC | Age: 53
End: 2023-12-21
Payer: OTHER MISCELLANEOUS

## 2023-12-21 DIAGNOSIS — M48.061 SPINAL STENOSIS, LUMBAR REGION WITHOUT NEUROGENIC CLAUDICATION: ICD-10-CM

## 2023-12-21 DIAGNOSIS — M51.26 OTHER INTERVERTEBRAL DISC DISPLACEMENT, LUMBAR REGION: ICD-10-CM

## 2023-12-21 DIAGNOSIS — M47.816 SPONDYLOSIS W/OUT MYELOPATHY OR RADICULOPATHY, LUMBAR REGION: ICD-10-CM

## 2023-12-21 PROCEDURE — 99204 OFFICE O/P NEW MOD 45 MIN: CPT

## 2023-12-21 NOTE — PHYSICAL EXAM
[FreeTextEntry1] : SYDNEY is a 53 year old male  Constitutional: healthy appearing, NAD, and overweight  LUMBAR ROM: flexion to 30 deg, ext to 5 deg  Gait: slightly antalgic  Inspection: no erythema, warmth Spine: no TTP in spinous process Bony palpation: no TTP in GT  Soft tissue palpation hip: no TTP in gluteus mulugeta Soft tissue palpation of spine: no TTP in lumbar paraspinals  5/5 bilateral KE, DF, PF  sensation intact in bilat LE reflexes: ankle 1+ bilat  Special tests: neg seated SLR

## 2023-12-21 NOTE — HISTORY OF PRESENT ILLNESS
[FreeTextEntry1] : Location: right back and hip Severity: 5/10 Duration: few months Context: on 2/20/23 he was at work for [employer] and his right leg slipped causing back and hip pain.  Aggravating Factors:  Alleviating Factors:  Associated Symptoms: denies weight loss, fever, chills, change in bowel/bladder habits, weakness, numbness/tingling, radiation down  Prior Studies: MRI back, hip xray

## 2023-12-21 NOTE — ASSESSMENT
[FreeTextEntry1] : MRI lumbar shows multilevel disc herniations with nerve impingement, multilevel listhesis. See PCP about bladder and kidneys.  Discussed diagnosis and treatment plan including PT. Hearing just added back to case.  He never went to PT for his back.  Discussed LISET if not better continue ibuprofen prn  things properly  Talked to Dr Dao about plan.   25% temp disability.  f/u 4 wk

## 2023-12-31 ENCOUNTER — NON-APPOINTMENT (OUTPATIENT)
Age: 53
End: 2023-12-31

## 2024-01-04 NOTE — ED ADULT NURSE NOTE - PRO INTERPRETER NEED 2
2024    Shwetha Maradiaga (:  1942) is a 81 y.o. female, here for evaluation of the following chief complaint(s):  3 Month Follow-Up (fasting). Would like to have feet looked at to see if toe fungus is going away )        ASSESSMENT/PLAN:    1. Atherosclerosis of native coronary artery of native heart with angina pectoris (HCC)  Work on DC smoking advised.  Take medication faithfully.    - pitavastatin (LIVALO) 4 MG TABS tablet; TAKE 1 TABLET NIGHTLY  Dispense: 90 tablet; Refill: 3    2. Hypercholesteremia  Not at goal.  On highest dose of statin she can tolerate.  Will consider adding Zetia if not better next lab.  - pitavastatin (LIVALO) 4 MG TABS tablet; TAKE 1 TABLET NIGHTLY  Dispense: 90 tablet; Refill: 3    3. Obesity, Class III, BMI 40-49.9 (morbid obesity) ([facility])  Weight is trending down.  She is a smoker so will have to monitor closely to see if it continues to drop too quickly.      4. COPD, mild ([facility])  In .  She has declined to get repeat PFTs or spirometry     5. Recurrent major depressive disorder, in full remission ([facility])      3/29/2023     1:58 PM 3/15/2023     4:18 PM 3/6/2022     5:13 PM 2020     2:11 PM 2017     1:05 PM 2015    10:41 AM   PHQ Scores   PHQ2 Score 2 0 1 2 3 2   PHQ9 Score 3 0 1 2 6 2     Interpretation of Total Score Depression Severity: 1-4 = Minimal depression, 5-9 = Mild depression, 10-14 = Moderate depression, 15-19 = Moderately severe depression, 20-27 = Severe depression      6. Type 2 diabetes mellitus with stage 3b chronic kidney disease, without long-term current use of insulin ([facility])  Last A1C 5.8 = At goal and meeting medical guidelines.  Continue treatment.      7. 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  Some vision impairment.  Has avoided the sun all her life.      8. Stage 3b chronic kidney disease (HCC)  - CBC with Auto Differential  - Basic Metabolic Panel  - Urinalysis with Microscopic  - Uric Acid    9. Chronic pain of  English

## 2024-01-16 ENCOUNTER — APPOINTMENT (OUTPATIENT)
Dept: GASTROENTEROLOGY | Facility: CLINIC | Age: 54
End: 2024-01-16
Payer: COMMERCIAL

## 2024-01-16 VITALS
OXYGEN SATURATION: 98 % | WEIGHT: 209 LBS | HEART RATE: 79 BPM | RESPIRATION RATE: 14 BRPM | SYSTOLIC BLOOD PRESSURE: 132 MMHG | BODY MASS INDEX: 31.67 KG/M2 | HEIGHT: 68 IN | TEMPERATURE: 96.5 F | DIASTOLIC BLOOD PRESSURE: 81 MMHG

## 2024-01-16 DIAGNOSIS — Z12.11 ENCOUNTER FOR SCREENING FOR MALIGNANT NEOPLASM OF COLON: ICD-10-CM

## 2024-01-16 PROCEDURE — 99203 OFFICE O/P NEW LOW 30 MIN: CPT

## 2024-01-16 RX ORDER — TRIAMTERENE AND HYDROCHLOROTHIAZIDE 37.5; 25 MG/1; MG/1
37.5-25 CAPSULE ORAL
Qty: 26 | Refills: 3 | Status: DISCONTINUED | COMMUNITY
Start: 2021-11-10 | End: 2024-01-16

## 2024-01-16 RX ORDER — NAPROXEN 500 MG/1
500 TABLET ORAL
Qty: 28 | Refills: 1 | Status: DISCONTINUED | COMMUNITY
Start: 2023-05-25 | End: 2024-01-16

## 2024-01-16 RX ORDER — MECLIZINE HYDROCHLORIDE 25 MG/1
25 TABLET ORAL
Qty: 90 | Refills: 0 | Status: DISCONTINUED | COMMUNITY
End: 2024-01-16

## 2024-01-16 NOTE — PHYSICAL EXAM
[Bowel Sounds] : normal bowel sounds [Abdomen Tenderness] : non-tender [No Masses] : no abdominal mass palpated [Abdomen Soft] : soft [Abnormal Walk] : normal gait [Normal Color / Pigmentation] : normal skin color and pigmentation [] : no rash [Normal Turgor] : normal skin turgor [No Focal Deficits] : no focal deficits [Normal] : oriented to person, place, and time [Oriented To Time, Place, And Person] : oriented to person, place, and time [Normal Affect] : the affect was normal [Normal Mood] : the mood was normal

## 2024-01-18 NOTE — HISTORY OF PRESENT ILLNESS
[FreeTextEntry1] : 53M with PMHx HTN referred by Dr. Moe for colon cancer screening.  HPI- no GI complaints, feeling well   Colon cancer screening- January 2019   PMHx- HTN PSHx- rotator cuff surgery (2014 left shoulder and 2017 right shoulder), kidney stones, skin grafting as 4 year old  Rx- amlodipine  Supplements/herbs/OTC- mens one a day vitamins, ashwagandha  A/c or NSAIDs? ibuprofen 800 mg twice a week for hip problems  FHx- father ? colon cancer at 72  Allergies- NKDA  ETOH- none  Smoking- none  Drugs- none   Labs/stool tests- June 2023 CMP/CBC wnl Breath tests- none Imaging- CT abdomen/pelvis January 2022: no GI pathology  EGD- never Colonoscopy- 2019, 1 TA

## 2024-01-18 NOTE — ASSESSMENT
[FreeTextEntry1] : 53M with PMHx HTN referred by Dr. Moe for colon cancer screening.  Colon cancer screening  - schedule patient for colonoscopy @ [facility], r/a/i/b discussed and patient agreeable  - bowel prep instructions to be provided, MiraLAX  - informed patient escort must pick patient up from procedure   f/u post colonoscopy

## 2024-01-30 ENCOUNTER — APPOINTMENT (OUTPATIENT)
Dept: UROLOGY | Facility: CLINIC | Age: 54
End: 2024-01-30
Payer: COMMERCIAL

## 2024-01-30 ENCOUNTER — APPOINTMENT (OUTPATIENT)
Dept: HEART AND VASCULAR | Facility: CLINIC | Age: 54
End: 2024-01-30
Payer: COMMERCIAL

## 2024-01-30 VITALS
HEART RATE: 72 BPM | BODY MASS INDEX: 31.67 KG/M2 | DIASTOLIC BLOOD PRESSURE: 90 MMHG | HEIGHT: 68 IN | SYSTOLIC BLOOD PRESSURE: 123 MMHG | TEMPERATURE: 98.3 F | WEIGHT: 209 LBS | OXYGEN SATURATION: 98 %

## 2024-01-30 VITALS — TEMPERATURE: 98.2 F | SYSTOLIC BLOOD PRESSURE: 157 MMHG | DIASTOLIC BLOOD PRESSURE: 110 MMHG | HEART RATE: 60 BPM

## 2024-01-30 VITALS — DIASTOLIC BLOOD PRESSURE: 82 MMHG | SYSTOLIC BLOOD PRESSURE: 140 MMHG

## 2024-01-30 DIAGNOSIS — N40.1 BENIGN PROSTATIC HYPERPLASIA WITH LOWER URINARY TRACT SYMPMS: ICD-10-CM

## 2024-01-30 DIAGNOSIS — R73.09 OTHER ABNORMAL GLUCOSE: ICD-10-CM

## 2024-01-30 DIAGNOSIS — E78.00 PURE HYPERCHOLESTEROLEMIA, UNSPECIFIED: ICD-10-CM

## 2024-01-30 DIAGNOSIS — N13.8 BENIGN PROSTATIC HYPERPLASIA WITH LOWER URINARY TRACT SYMPMS: ICD-10-CM

## 2024-01-30 DIAGNOSIS — N40.0 BENIGN PROSTATIC HYPERPLASIA WITHOUT LOWER URINARY TRACT SYMPMS: ICD-10-CM

## 2024-01-30 DIAGNOSIS — I10 ESSENTIAL (PRIMARY) HYPERTENSION: ICD-10-CM

## 2024-01-30 DIAGNOSIS — R39.9 UNSPECIFIED SYMPTOMS AND SIGNS INVOLVING THE GENITOURINARY SYSTEM: ICD-10-CM

## 2024-01-30 DIAGNOSIS — Z12.5 ENCOUNTER FOR SCREENING FOR MALIGNANT NEOPLASM OF PROSTATE: ICD-10-CM

## 2024-01-30 PROCEDURE — 99214 OFFICE O/P EST MOD 30 MIN: CPT

## 2024-01-30 PROCEDURE — 99213 OFFICE O/P EST LOW 20 MIN: CPT

## 2024-01-30 RX ORDER — FLUTICASONE PROPIONATE 50 UG/1
50 SPRAY, METERED NASAL DAILY
Qty: 1 | Refills: 2 | Status: ACTIVE | COMMUNITY
Start: 2024-01-30 | End: 1900-01-01

## 2024-01-30 NOTE — HISTORY OF PRESENT ILLNESS
[FreeTextEntry1] : 52 y/o male with HTN and h/o nephrolithiasis presenting for f/u of microscopic hematuria (7 RBC) found on PCP routine labs 11/10. He's had this in the past. No recent imaging.  Endorses frequency, urgency, nocturia x2.  Denies straining, PVD. Feels as though he empties his bladder.  Denies gross hematuria, dysuria, flank pain  Was prescribed Tamsulosin by PCP but not taking at this time  Hx of nephrolithiasis - 1st stone 5+, years ago - 2nd stone - about 5 years treated with lithotripsy + stents  Denies FM of  malignancies non smoker  1/31/22 Underwent CT urogram earlier today which shows 6mm non obstructing left kidney stone. Was scheduled for cystoscopy today. He is asymptomatic.  4/18/22 s/p left ureteroscopy, laser lithotripsy for left renal stone. Here for postop US. Stone composition: 100% Ca Ox. No current complaints.   Patient is currently experiencing no symptoms.   Pain Level: None ********************* 1/30/24: Here for f/u. He has hx kidney stones s/p URS with LL in 2022 with Dr. Cartagena. Hx of microhematuria with negative workup in 2022.  He also has LUTS. Was prescribed tamsulosin 0.4 mg but did not take due to side effects. He saw PCP today, suggested Cardura for BPH and HTN.  He states frequency, urgency, and nocturia are most bothersome symptoms. He empties bladder. Stopped fluids before bed, nocturia improved to x1. He is due for PSA.  Ultrasound in 7/2023 showed multiple benign kidney cysts.   PVR on recent US was 14 ml. Prostate was 33 grams on recent ultrasound. Last PSA was 11/2021 and was 0.49.   IPSS = 7; QoL = 2

## 2024-01-30 NOTE — PHYSICAL EXAM
[General Appearance - In No Acute Distress] : no acute distress [] : no respiratory distress [Oriented To Time, Place, And Person] : oriented to person, place, and time [General Appearance - Well Developed] : well developed [General Appearance - Well Nourished] : well nourished [Heart Rate And Rhythm] : heart rate and rhythm were normal [Abdomen Soft] : soft [Abdomen Tenderness] : non-tender [Abdomen Hernia] : no hernia was discovered [Costovertebral Angle Tenderness] : no ~M costovertebral angle tenderness [Urethral Meatus] : meatus normal [Penis Abnormality] : normal circumcised penis [Epididymis] : the epididymides were normal [Testes Tenderness] : no tenderness of the testes [Testes Mass (___cm)] : there were no testicular masses [Prostate Tenderness] : the prostate was not tender [No Prostate Nodules] : no prostate nodules [Prostate Size ___ (0-4)] : prostate size [unfilled] (scale: 0-4) [Normal Station and Gait] : the gait and station were normal for the patient's age [Skin Color & Pigmentation] : normal skin color and pigmentation [No Focal Deficits] : no focal deficits [Not Anxious] : not anxious No

## 2024-01-30 NOTE — HISTORY OF PRESENT ILLNESS
[FreeTextEntry1] : Allergist- Dr [facility]- Uma, Feliberto GAMBLE- Dr Yanique MCNULTY- Dr Kevin Barreto  Wife is  Ms As. Vasile

## 2024-01-30 NOTE — ASSESSMENT
[FreeTextEntry1] : 53 year old male with hx kidney stones s/p URS w/ LL in 2022, microhematuria with negative workup in 2022 presents with BPH/LUTS.   Lower urinary symptoms were reviewed including the potential etiologies of the patient's symptoms. The anatomy of the urinary tract was reviewed. Bladder, prostate, and urethral sources, as well as non-urologic sources, were discussed. Options for evaluation were discussed including cystoscopy, urodynamics, and pelvis ultrasonography. Management of symptoms were reviewed including no therapy, medical therapy, and surgical therapy. The long term sequelae of no treatment, including no adverse effects, potential for progressive lower urinary tract symptoms or deterioration, urinary retention, bladder dysfunction, and renal dysfunction were reviewed.   Medical therapy for BPH (benign prostatic hyperplasia), or prostate enlargement, was reviewed including the physiology of medication therapy. Alpha blocker medication therapy was reviewed including adverse effects (including dizziness, hypotension, retrograde ejaculation, rhinitis, fatigue), proper usage, and contraindications.   Patient opts to try Cardura for his LUTS and BP. PSA sent today for annual screening. RTC 1 month

## 2024-01-30 NOTE — REVIEW OF SYSTEMS
[Lower Ext Edema] : lower extremity edema [Negative] : Heme/Lymph [Urinary Frequency] : urinary frequency [Nocturia] : nocturia

## 2024-01-30 NOTE — ASSESSMENT
[FreeTextEntry1] : HTN-  BP good on Amlodipine. Not taking it. Pt to resume, admonished.  BP up slightly, adding Dyazide M [employer] F.  He did not take and has urinary frequency so not a good choice.  Consider [facility], pt going to  today.  Elevated Glucose- diet reviewed, A1c sent, 5.3  HLD- , diet, wt loss reviewed.  Needs to lose weight, labs in 4-6 mos.  Will treat if above 160. 137,   145  Health Maintainence- Colonoscopy done by Dr Chanel 1/2019, PSA and GUTIERREZ 11/30/18, PSA NL but microscopic hematuria,   PSA 11/2021.  No flu or Covid shots!!!!.  Vertigo- CT head (-) in the ER  BPH- could not reach entire gland, PSA sent.  Urologist is now  Dr Barreto.  Flomax started by me 9/23/20, not taking.  PreOp- Pt cleared for Urologic surgery with Dr Cartagena.  Labs excellent, EKG NL, CXR ordered.

## 2024-01-30 NOTE — REASON FOR VISIT
[FreeTextEntry1] : 49 y/o with HTN, dizziness glucose 110. Seen in ER Oct 25, 2018.  BP up, CT head (-). Sugar mildly elevated in ER, diet poor  EKG: NSR, normal axis and intervals, no ST-Tw abnormalities. 11/30/18  Labs reveal  and microscopic hematuria  9/27/19- getting spasms in the hands, has left ankle pain. BP up, not taking Norvasc 9/23/20  On Sept 3rd he ate out in a restaurant, that night got RUQ pain. This recurred x1.  Nocturia x 2 and urgency reported. 11/10/21 Last here June.  Has not been vaccinated for Covid, long discussion  2/1/22  Having Cysto 2/15/22  Labs are good 10/19/22  6/14/23  Fell at work on the tracks 2/20/23, went to ER and an MD.  + right Pelvic and hip/lower back pain. Saw Dr Naty Dao.  Pain has spread to left side.   MRI of right knee with fraying MCL. MRI right hip with tendinosis, labrum tear.  Right ankle edema.  On Methocarbimol 1/30/24 Having BPH sxs, seeing Dr Barreto today.

## 2024-01-31 LAB — PSA SERPL-MCNC: 0.7 NG/ML

## 2024-01-31 RX ORDER — DOXAZOSIN 4 MG/1
4 TABLET ORAL DAILY
Qty: 90 | Refills: 2 | Status: ACTIVE | COMMUNITY
Start: 2024-01-31 | End: 1900-01-01

## 2024-02-07 ENCOUNTER — APPOINTMENT (OUTPATIENT)
Dept: ORTHOPEDIC SURGERY | Facility: CLINIC | Age: 54
End: 2024-02-07
Payer: OTHER MISCELLANEOUS

## 2024-02-07 DIAGNOSIS — S80.11XD CONTUSION OF RIGHT LOWER LEG, SUBSEQUENT ENCOUNTER: ICD-10-CM

## 2024-02-07 DIAGNOSIS — S80.01XD CONTUSION OF RIGHT KNEE, SUBSEQUENT ENCOUNTER: ICD-10-CM

## 2024-02-07 PROCEDURE — 99213 OFFICE O/P EST LOW 20 MIN: CPT

## 2024-02-07 NOTE — HISTORY OF PRESENT ILLNESS
[de-identified] : 53-year-old gentleman comes in for follow up for right knee and thigh contusion that occurred in February 2020 and I had treated through May 21, 2021.  At that last visit he was doing significantly better when I had referred him for a little more physical therapy which he did.  He had gotten mostly better after that and never came back for his follow-up in 2 do a final determination and close the case. When I last saw him he was back to his regular level of work.  I did not see him again until May 2023 about 3 months following a more recent Workmen's Compensation injury which was quite similar in mechanism where his leg also fell through the train tracks and had contusions and recurrent back pain. Most recent notes are really about that new work injury. That fall had to cause more pain in his right hip and thigh and low back. His right knee really has been okay.  There was no effusion and an MRI that was performed February 27, 2023 showed some thickening of the MCL consistent with prior sprain but no new tears or fractures and there was no meniscal injury.  No significant arthritis. When I last saw him in May 2021 his right knee had mostly recovered with just a little bit of intermittent stiffness but there is no swelling and there was good motion and strength and overall good function and he was working without limitations.

## 2024-02-07 NOTE — REASON FOR VISIT
[Follow-Up Visit] : a follow-up visit for [Workers' Comp: Date of Injury: _______] : This visit is related to worker's compensation. Date of Injury: [unfilled] [FreeTextEntry2] : right knee contusion

## 2024-02-07 NOTE — PHYSICAL EXAM
[LE] : Sensory: Intact in bilateral lower extremities [Normal RLE] : Right Lower Extremity: No scars, rashes, lesions, ulcers, skin intact [Normal LLE] : Left Lower Extremity: No scars, rashes, lesions, ulcers, skin intact [Normal Touch] : sensation intact for touch [Normal] : No swelling, no edema, normal pedal pulses and normal temperature [de-identified] : Right knee and thigh Normal gait. There is scar medial knee which is soft and mobile with minimal tenderness on the medial side of his knee somewhat similar to tenderness in the left medial knee. Knee range of motion is 0 to 135 degrees flexion. Ia Lachman.  Negative anterior and posterior drawer.  Normal varus and valgus laxity.  Intact extensor mechanism. Negative Abdiel. Motor and sensation are intact distally [de-identified] : MRI right knee 2/27/2023 showed enchondroma in the femur and fraying of the MCL and thickening that may be consistent with prior injury.  Lateral patella tilt and small joint effusion.  2/25/2020 right knee MRI images showing him the soft tissue edema/resolving hematoma medial knee.  No tears.  Normal ligaments, bones, meniscus, tendons.

## 2024-02-07 NOTE — ASSESSMENT
[FreeTextEntry1] : 53-year-old who had a contusion to his right medial knee and thigh 4 years ago on February 2, 2020.  I had treated him over the following 15 months approximately and he did physical therapy.  The hematoma and bruising resolved and he got back good function and was back to work full duty after that.  He never came in for final determination after doing more physical therapy and the case apparently was never closed which is why he comes in today. He had very good range of motion of the knee at that time and still today and recovered well with a stable knee. He has some chronic mild discomfort in the medial knee where there is scar tissue.  There was a 5% loss of use right knee.  He reached maximal medical improvement about 2 years ago.

## 2024-02-15 RX ORDER — IBUPROFEN 800 MG/1
800 TABLET, FILM COATED ORAL
Refills: 0 | Status: ACTIVE | COMMUNITY

## 2024-02-15 RX ORDER — DOXAZOSIN MESYLATE 4 MG/1
4 TABLET, FILM COATED, EXTENDED RELEASE ORAL DAILY
Qty: 30 | Refills: 2 | Status: COMPLETED | COMMUNITY
Start: 2024-01-30 | End: 2024-02-15

## 2024-02-15 RX ORDER — IBUPROFEN 600 MG/1
600 TABLET, FILM COATED ORAL
Qty: 20 | Refills: 0 | Status: COMPLETED | COMMUNITY
Start: 2022-05-26 | End: 2024-02-15

## 2024-02-29 ENCOUNTER — RESULT REVIEW (OUTPATIENT)
Age: 54
End: 2024-02-29

## 2024-02-29 ENCOUNTER — APPOINTMENT (OUTPATIENT)
Age: 54
End: 2024-02-29
Payer: COMMERCIAL

## 2024-02-29 PROCEDURE — 45385 COLONOSCOPY W/LESION REMOVAL: CPT | Mod: 33

## 2024-02-29 PROCEDURE — 45380 COLONOSCOPY AND BIOPSY: CPT | Mod: 33,59

## 2024-03-07 ENCOUNTER — NON-APPOINTMENT (OUTPATIENT)
Age: 54
End: 2024-03-07

## 2024-10-30 ENCOUNTER — NON-APPOINTMENT (OUTPATIENT)
Age: 54
End: 2024-10-30

## 2024-11-06 ENCOUNTER — APPOINTMENT (OUTPATIENT)
Dept: ORTHOPEDIC SURGERY | Facility: CLINIC | Age: 54
End: 2024-11-06
Payer: COMMERCIAL

## 2024-11-06 DIAGNOSIS — R20.2 PARESTHESIA OF SKIN: ICD-10-CM

## 2024-11-06 PROCEDURE — 72100 X-RAY EXAM L-S SPINE 2/3 VWS: CPT

## 2024-11-06 PROCEDURE — 99214 OFFICE O/P EST MOD 30 MIN: CPT

## 2024-11-27 ENCOUNTER — APPOINTMENT (OUTPATIENT)
Dept: ORTHOPEDIC SURGERY | Facility: CLINIC | Age: 54
End: 2024-11-27
Payer: COMMERCIAL

## 2024-11-27 DIAGNOSIS — M67.952 UNSPECIFIED DISORDER OF SYNOVIUM AND TENDON, LEFT THIGH: ICD-10-CM

## 2024-11-27 DIAGNOSIS — M67.951 UNSPECIFIED DISORDER OF SYNOVIUM AND TENDON, RIGHT THIGH: ICD-10-CM

## 2024-11-27 DIAGNOSIS — M16.0 BILATERAL PRIMARY OSTEOARTHRITIS OF HIP: ICD-10-CM

## 2024-11-27 PROCEDURE — 99441: CPT

## 2025-01-15 ENCOUNTER — APPOINTMENT (OUTPATIENT)
Dept: ORTHOPEDIC SURGERY | Facility: CLINIC | Age: 55
End: 2025-01-15
Payer: OTHER MISCELLANEOUS

## 2025-01-15 ENCOUNTER — NON-APPOINTMENT (OUTPATIENT)
Age: 55
End: 2025-01-15

## 2025-01-15 VITALS — HEIGHT: 68 IN | WEIGHT: 212 LBS | BODY MASS INDEX: 32.13 KG/M2

## 2025-01-15 DIAGNOSIS — M54.16 RADICULOPATHY, LUMBAR REGION: ICD-10-CM

## 2025-01-15 DIAGNOSIS — M51.369: ICD-10-CM

## 2025-01-15 PROCEDURE — 72083 X-RAY EXAM ENTIRE SPI 4/5 VW: CPT

## 2025-01-15 PROCEDURE — 99204 OFFICE O/P NEW MOD 45 MIN: CPT

## 2025-01-16 PROBLEM — M54.16 CHRONIC LUMBAR RADICULOPATHY: Status: ACTIVE | Noted: 2025-01-16

## 2025-01-16 PROBLEM — M51.369 DISC DEGENERATION, LUMBAR: Status: ACTIVE | Noted: 2025-01-16

## 2025-02-11 ENCOUNTER — NON-APPOINTMENT (OUTPATIENT)
Age: 55
End: 2025-02-11

## 2025-02-11 ENCOUNTER — APPOINTMENT (OUTPATIENT)
Dept: HEART AND VASCULAR | Facility: CLINIC | Age: 55
End: 2025-02-11
Payer: COMMERCIAL

## 2025-02-11 ENCOUNTER — LABORATORY RESULT (OUTPATIENT)
Age: 55
End: 2025-02-11

## 2025-02-11 VITALS
HEIGHT: 68 IN | TEMPERATURE: 97.7 F | BODY MASS INDEX: 31.83 KG/M2 | WEIGHT: 210 LBS | DIASTOLIC BLOOD PRESSURE: 90 MMHG | HEART RATE: 71 BPM | OXYGEN SATURATION: 99 % | SYSTOLIC BLOOD PRESSURE: 138 MMHG

## 2025-02-11 VITALS — DIASTOLIC BLOOD PRESSURE: 92 MMHG | SYSTOLIC BLOOD PRESSURE: 144 MMHG

## 2025-02-11 DIAGNOSIS — M79.642 PAIN IN RIGHT HAND: ICD-10-CM

## 2025-02-11 DIAGNOSIS — I10 ESSENTIAL (PRIMARY) HYPERTENSION: ICD-10-CM

## 2025-02-11 DIAGNOSIS — M79.641 PAIN IN RIGHT HAND: ICD-10-CM

## 2025-02-11 DIAGNOSIS — R73.09 OTHER ABNORMAL GLUCOSE: ICD-10-CM

## 2025-02-11 DIAGNOSIS — E78.00 PURE HYPERCHOLESTEROLEMIA, UNSPECIFIED: ICD-10-CM

## 2025-02-11 PROCEDURE — 36415 COLL VENOUS BLD VENIPUNCTURE: CPT

## 2025-02-11 PROCEDURE — 99214 OFFICE O/P EST MOD 30 MIN: CPT

## 2025-02-11 PROCEDURE — 93000 ELECTROCARDIOGRAM COMPLETE: CPT

## 2025-02-11 RX ORDER — OMEPRAZOLE 40 MG/1
40 CAPSULE, DELAYED RELEASE ORAL
Qty: 30 | Refills: 1 | Status: ACTIVE | COMMUNITY
Start: 2025-02-11 | End: 1900-01-01

## 2025-02-13 LAB
ALBUMIN SERPL ELPH-MCNC: 4.4 G/DL
ALP BLD-CCNC: 78 U/L
ALT SERPL-CCNC: 23 U/L
ANION GAP SERPL CALC-SCNC: 11 MMOL/L
APPEARANCE: CLEAR
AST SERPL-CCNC: 19 U/L
BACTERIA: NEGATIVE /HPF
BASOPHILS # BLD AUTO: 0.09 K/UL
BASOPHILS NFR BLD AUTO: 1.8 %
BILIRUB SERPL-MCNC: 0.2 MG/DL
BILIRUBIN URINE: NEGATIVE
BLOOD URINE: ABNORMAL
BUN SERPL-MCNC: 16 MG/DL
CALCIUM SERPL-MCNC: 9.9 MG/DL
CAST: 0 /LPF
CHLORIDE SERPL-SCNC: 102 MMOL/L
CHOLEST SERPL-MCNC: 211 MG/DL
CO2 SERPL-SCNC: 25 MMOL/L
COLOR: YELLOW
CREAT SERPL-MCNC: 1 MG/DL
EGFR: 88 ML/MIN/1.73M2
EOSINOPHIL # BLD AUTO: 0.68 K/UL
EOSINOPHIL NFR BLD AUTO: 13.3 %
EPITHELIAL CELLS: 0 /HPF
ERYTHROCYTE [SEDIMENTATION RATE] IN BLOOD BY WESTERGREN METHOD: 2 MM/HR
ESTIMATED AVERAGE GLUCOSE: 108 MG/DL
GLUCOSE QUALITATIVE U: NEGATIVE MG/DL
GLUCOSE SERPL-MCNC: 105 MG/DL
HBA1C MFR BLD HPLC: 5.4 %
HCT VFR BLD CALC: 43.3 %
HDLC SERPL-MCNC: 39 MG/DL
HGB BLD-MCNC: 14.7 G/DL
KETONES URINE: NEGATIVE MG/DL
LDLC SERPL CALC-MCNC: 133 MG/DL
LEUKOCYTE ESTERASE URINE: NEGATIVE
LYMPHOCYTES # BLD AUTO: 2.52 K/UL
LYMPHOCYTES NFR BLD AUTO: 49.5 %
MAN DIFF?: NORMAL
MCHC RBC-ENTMCNC: 31.1 PG
MCHC RBC-ENTMCNC: 33.9 G/DL
MCV RBC AUTO: 91.7 FL
MICROSCOPIC-UA: NORMAL
MONOCYTES # BLD AUTO: 0.36 K/UL
MONOCYTES NFR BLD AUTO: 7.1 %
NEUTROPHILS # BLD AUTO: 1.44 K/UL
NEUTROPHILS NFR BLD AUTO: 28.3 %
NITRITE URINE: NEGATIVE
NONHDLC SERPL-MCNC: 172 MG/DL
PH URINE: 6
PLATELET # BLD AUTO: 252 K/UL
POTASSIUM SERPL-SCNC: 4.1 MMOL/L
PROT SERPL-MCNC: 7.1 G/DL
PROTEIN URINE: NEGATIVE MG/DL
RBC # BLD: 4.72 M/UL
RBC # FLD: 12.8 %
RED BLOOD CELLS URINE: 3 /HPF
RHEUMATOID FACT SER QL: <10 IU/ML
SODIUM SERPL-SCNC: 138 MMOL/L
SPECIFIC GRAVITY URINE: 1.02
TRIGL SERPL-MCNC: 221 MG/DL
UROBILINOGEN URINE: 0.2 MG/DL
WBC # FLD AUTO: 5.1 K/UL
WHITE BLOOD CELLS URINE: 0 /HPF

## 2025-03-20 ENCOUNTER — RX RENEWAL (OUTPATIENT)
Age: 55
End: 2025-03-20

## 2025-04-07 ENCOUNTER — APPOINTMENT (OUTPATIENT)
Dept: HEART AND VASCULAR | Facility: CLINIC | Age: 55
End: 2025-04-07

## 2025-04-07 VITALS
OXYGEN SATURATION: 98 % | SYSTOLIC BLOOD PRESSURE: 150 MMHG | BODY MASS INDEX: 32.43 KG/M2 | DIASTOLIC BLOOD PRESSURE: 98 MMHG | TEMPERATURE: 98.1 F | HEIGHT: 68 IN | WEIGHT: 214 LBS | HEART RATE: 74 BPM

## 2025-04-07 DIAGNOSIS — I10 ESSENTIAL (PRIMARY) HYPERTENSION: ICD-10-CM

## 2025-04-07 DIAGNOSIS — R73.09 OTHER ABNORMAL GLUCOSE: ICD-10-CM

## 2025-04-07 DIAGNOSIS — M19.042 PRIMARY OSTEOARTHRITIS, RIGHT HAND: ICD-10-CM

## 2025-04-07 DIAGNOSIS — E78.00 PURE HYPERCHOLESTEROLEMIA, UNSPECIFIED: ICD-10-CM

## 2025-04-07 DIAGNOSIS — M19.041 PRIMARY OSTEOARTHRITIS, RIGHT HAND: ICD-10-CM

## 2025-04-07 PROCEDURE — 36415 COLL VENOUS BLD VENIPUNCTURE: CPT

## 2025-04-07 PROCEDURE — 99214 OFFICE O/P EST MOD 30 MIN: CPT | Mod: 25

## 2025-04-07 RX ORDER — MELOXICAM 15 MG/1
15 TABLET ORAL DAILY
Qty: 90 | Refills: 0 | Status: ACTIVE | COMMUNITY
Start: 2025-04-07 | End: 1900-01-01

## 2025-04-09 LAB
ALBUMIN SERPL ELPH-MCNC: 4.3 G/DL
ALP BLD-CCNC: 71 U/L
ALT SERPL-CCNC: 23 U/L
ANION GAP SERPL CALC-SCNC: 11 MMOL/L
APPEARANCE: CLEAR
AST SERPL-CCNC: 21 U/L
BACTERIA: NEGATIVE /HPF
BILIRUB SERPL-MCNC: 0.3 MG/DL
BILIRUBIN URINE: NEGATIVE
BLOOD URINE: ABNORMAL
BUN SERPL-MCNC: 15 MG/DL
CALCIUM SERPL-MCNC: 9.4 MG/DL
CAST: 0 /LPF
CHLORIDE SERPL-SCNC: 102 MMOL/L
CO2 SERPL-SCNC: 25 MMOL/L
COLOR: YELLOW
CREAT SERPL-MCNC: 1.05 MG/DL
EGFRCR SERPLBLD CKD-EPI 2021: 83 ML/MIN/1.73M2
EPITHELIAL CELLS: 0 /HPF
ESTIMATED AVERAGE GLUCOSE: 105 MG/DL
GLUCOSE QUALITATIVE U: NEGATIVE MG/DL
GLUCOSE SERPL-MCNC: 125 MG/DL
HBA1C MFR BLD HPLC: 5.3 %
KETONES URINE: NEGATIVE MG/DL
LEUKOCYTE ESTERASE URINE: NEGATIVE
MICROSCOPIC-UA: NORMAL
NITRITE URINE: NEGATIVE
PH URINE: 6.5
POTASSIUM SERPL-SCNC: 4.3 MMOL/L
PROT SERPL-MCNC: 7.1 G/DL
PROTEIN URINE: NEGATIVE MG/DL
RED BLOOD CELLS URINE: 5 /HPF
SODIUM SERPL-SCNC: 138 MMOL/L
SPECIFIC GRAVITY URINE: 1.02
UROBILINOGEN URINE: 1 MG/DL
WHITE BLOOD CELLS URINE: 0 /HPF

## 2025-04-15 ENCOUNTER — APPOINTMENT (OUTPATIENT)
Dept: ORTHOPEDIC SURGERY | Facility: CLINIC | Age: 55
End: 2025-04-15
Payer: OTHER MISCELLANEOUS

## 2025-04-15 DIAGNOSIS — M51.369: ICD-10-CM

## 2025-04-15 DIAGNOSIS — M51.26 OTHER INTERVERTEBRAL DISC DISPLACEMENT, LUMBAR REGION: ICD-10-CM

## 2025-04-15 DIAGNOSIS — M54.16 RADICULOPATHY, LUMBAR REGION: ICD-10-CM

## 2025-04-15 PROCEDURE — 99213 OFFICE O/P EST LOW 20 MIN: CPT

## 2025-05-14 ENCOUNTER — APPOINTMENT (OUTPATIENT)
Dept: ORTHOPEDIC SURGERY | Facility: CLINIC | Age: 55
End: 2025-05-14

## 2025-05-14 VITALS — WEIGHT: 213 LBS | BODY MASS INDEX: 32.28 KG/M2 | RESPIRATION RATE: 14 BRPM | HEIGHT: 68 IN

## 2025-05-14 DIAGNOSIS — M19.031 PRIMARY OSTEOARTHRITIS, RIGHT WRIST: ICD-10-CM

## 2025-05-14 DIAGNOSIS — M65.342 TRIGGER FINGER, LEFT RING FINGER: ICD-10-CM

## 2025-05-14 DIAGNOSIS — M19.032 PRIMARY OSTEOARTHRITIS, LEFT WRIST: ICD-10-CM

## 2025-05-14 DIAGNOSIS — M79.641 PAIN IN RIGHT HAND: ICD-10-CM

## 2025-05-14 PROCEDURE — 99204 OFFICE O/P NEW MOD 45 MIN: CPT | Mod: 25

## 2025-05-14 PROCEDURE — 73130 X-RAY EXAM OF HAND: CPT | Mod: 50

## 2025-05-14 PROCEDURE — 20550 NJX 1 TENDON SHEATH/LIGAMENT: CPT | Mod: LT

## 2025-06-05 ENCOUNTER — EMERGENCY (EMERGENCY)
Facility: HOSPITAL | Age: 55
LOS: 1 days | End: 2025-06-05
Attending: STUDENT IN AN ORGANIZED HEALTH CARE EDUCATION/TRAINING PROGRAM | Admitting: STUDENT IN AN ORGANIZED HEALTH CARE EDUCATION/TRAINING PROGRAM
Payer: OTHER MISCELLANEOUS

## 2025-06-05 VITALS
OXYGEN SATURATION: 98 % | HEART RATE: 71 BPM | HEIGHT: 68 IN | DIASTOLIC BLOOD PRESSURE: 100 MMHG | TEMPERATURE: 98 F | WEIGHT: 214.95 LBS | RESPIRATION RATE: 20 BRPM | SYSTOLIC BLOOD PRESSURE: 171 MMHG

## 2025-06-05 DIAGNOSIS — M54.16 RADICULOPATHY, LUMBAR REGION: ICD-10-CM

## 2025-06-05 DIAGNOSIS — M54.50 LOW BACK PAIN, UNSPECIFIED: ICD-10-CM

## 2025-06-05 DIAGNOSIS — I10 ESSENTIAL (PRIMARY) HYPERTENSION: ICD-10-CM

## 2025-06-05 DIAGNOSIS — M54.42 LUMBAGO WITH SCIATICA, LEFT SIDE: ICD-10-CM

## 2025-06-05 PROCEDURE — 99285 EMERGENCY DEPT VISIT HI MDM: CPT

## 2025-06-05 PROCEDURE — 99053 MED SERV 10PM-8AM 24 HR FAC: CPT

## 2025-06-05 RX ORDER — METHYLPREDNISOLONE 4 MG/1
4 TABLET ORAL
Qty: 1 | Refills: 0 | Status: ACTIVE | COMMUNITY
Start: 2025-06-05 | End: 1900-01-01

## 2025-06-05 RX ORDER — KETOROLAC TROMETHAMINE 30 MG/ML
15 INJECTION, SOLUTION INTRAMUSCULAR; INTRAVENOUS ONCE
Refills: 0 | Status: DISCONTINUED | OUTPATIENT
Start: 2025-06-05 | End: 2025-06-05

## 2025-06-05 RX ORDER — CYCLOBENZAPRINE HYDROCHLORIDE 5 MG/1
5 TABLET, FILM COATED ORAL 3 TIMES DAILY
Qty: 60 | Refills: 0 | Status: ACTIVE | COMMUNITY
Start: 2025-06-05 | End: 1900-01-01

## 2025-06-05 RX ORDER — DIAZEPAM 5 MG/1
5 TABLET ORAL ONCE
Refills: 0 | Status: DISCONTINUED | OUTPATIENT
Start: 2025-06-05 | End: 2025-06-05

## 2025-06-05 NOTE — ED ADULT TRIAGE NOTE - CHIEF COMPLAINT QUOTE
Pt c/o generalized lower back pain since Friday, currently pain on left lower extremity. Seen by Ortho and was given medications which provided no relief.

## 2025-06-05 NOTE — ED ADULT TRIAGE NOTE - NS ED TRIAGE AVPU SCALE
Alert-The patient is alert, awake and responds to voice. The patient is oriented to time, place, and person. The triage nurse is able to obtain subjective information.
PRE-OP DIAGNOSIS:  Posterior tibial tendonitis, right 17-Mar-2023 12:21:19  Shay Dent

## 2025-06-06 VITALS
DIASTOLIC BLOOD PRESSURE: 90 MMHG | OXYGEN SATURATION: 96 % | RESPIRATION RATE: 18 BRPM | TEMPERATURE: 99 F | SYSTOLIC BLOOD PRESSURE: 136 MMHG | HEART RATE: 70 BPM

## 2025-06-06 LAB
ANION GAP SERPL CALC-SCNC: 12 MMOL/L — SIGNIFICANT CHANGE UP (ref 5–17)
BASOPHILS # BLD AUTO: 0.01 K/UL — SIGNIFICANT CHANGE UP (ref 0–0.2)
BASOPHILS NFR BLD AUTO: 0.2 % — SIGNIFICANT CHANGE UP (ref 0–2)
BUN SERPL-MCNC: 14 MG/DL — SIGNIFICANT CHANGE UP (ref 7–23)
CALCIUM SERPL-MCNC: 9.6 MG/DL — SIGNIFICANT CHANGE UP (ref 8.4–10.5)
CHLORIDE SERPL-SCNC: 100 MMOL/L — SIGNIFICANT CHANGE UP (ref 96–108)
CK SERPL-CCNC: 289 U/L — HIGH (ref 30–200)
CO2 SERPL-SCNC: 24 MMOL/L — SIGNIFICANT CHANGE UP (ref 22–31)
CREAT SERPL-MCNC: 0.97 MG/DL — SIGNIFICANT CHANGE UP (ref 0.5–1.3)
EGFR: 91 ML/MIN/1.73M2 — SIGNIFICANT CHANGE UP
EGFR: 91 ML/MIN/1.73M2 — SIGNIFICANT CHANGE UP
EOSINOPHIL # BLD AUTO: 0 K/UL — SIGNIFICANT CHANGE UP (ref 0–0.5)
EOSINOPHIL NFR BLD AUTO: 0 % — SIGNIFICANT CHANGE UP (ref 0–6)
GLUCOSE SERPL-MCNC: 143 MG/DL — HIGH (ref 70–99)
HCT VFR BLD CALC: 44.6 % — SIGNIFICANT CHANGE UP (ref 39–50)
HGB BLD-MCNC: 15.9 G/DL — SIGNIFICANT CHANGE UP (ref 13–17)
IMM GRANULOCYTES NFR BLD AUTO: 0.3 % — SIGNIFICANT CHANGE UP (ref 0–0.9)
LYMPHOCYTES # BLD AUTO: 0.68 K/UL — LOW (ref 1–3.3)
LYMPHOCYTES # BLD AUTO: 11.2 % — LOW (ref 13–44)
MCHC RBC-ENTMCNC: 32.2 PG — SIGNIFICANT CHANGE UP (ref 27–34)
MCHC RBC-ENTMCNC: 35.7 G/DL — SIGNIFICANT CHANGE UP (ref 32–36)
MCV RBC AUTO: 90.3 FL — SIGNIFICANT CHANGE UP (ref 80–100)
MONOCYTES # BLD AUTO: 0.18 K/UL — SIGNIFICANT CHANGE UP (ref 0–0.9)
MONOCYTES NFR BLD AUTO: 3 % — SIGNIFICANT CHANGE UP (ref 2–14)
NEUTROPHILS # BLD AUTO: 5.2 K/UL — SIGNIFICANT CHANGE UP (ref 1.8–7.4)
NEUTROPHILS NFR BLD AUTO: 85.3 % — HIGH (ref 43–77)
NRBC BLD AUTO-RTO: 0 /100 WBCS — SIGNIFICANT CHANGE UP (ref 0–0)
PLATELET # BLD AUTO: 282 K/UL — SIGNIFICANT CHANGE UP (ref 150–400)
POTASSIUM SERPL-MCNC: 4.8 MMOL/L — SIGNIFICANT CHANGE UP (ref 3.5–5.3)
POTASSIUM SERPL-SCNC: 4.8 MMOL/L — SIGNIFICANT CHANGE UP (ref 3.5–5.3)
RBC # BLD: 4.94 M/UL — SIGNIFICANT CHANGE UP (ref 4.2–5.8)
RBC # FLD: 12.1 % — SIGNIFICANT CHANGE UP (ref 10.3–14.5)
SODIUM SERPL-SCNC: 136 MMOL/L — SIGNIFICANT CHANGE UP (ref 135–145)
WBC # BLD: 6.09 K/UL — SIGNIFICANT CHANGE UP (ref 3.8–10.5)
WBC # FLD AUTO: 6.09 K/UL — SIGNIFICANT CHANGE UP (ref 3.8–10.5)

## 2025-06-06 PROCEDURE — 80048 BASIC METABOLIC PNL TOTAL CA: CPT

## 2025-06-06 PROCEDURE — 96372 THER/PROPH/DIAG INJ SC/IM: CPT | Mod: XU

## 2025-06-06 PROCEDURE — 72131 CT LUMBAR SPINE W/O DYE: CPT

## 2025-06-06 PROCEDURE — 72131 CT LUMBAR SPINE W/O DYE: CPT | Mod: 26

## 2025-06-06 PROCEDURE — 99284 EMERGENCY DEPT VISIT MOD MDM: CPT | Mod: 25

## 2025-06-06 PROCEDURE — 85025 COMPLETE CBC W/AUTO DIFF WBC: CPT

## 2025-06-06 PROCEDURE — 82550 ASSAY OF CK (CPK): CPT

## 2025-06-06 PROCEDURE — 36415 COLL VENOUS BLD VENIPUNCTURE: CPT

## 2025-06-06 PROCEDURE — 96374 THER/PROPH/DIAG INJ IV PUSH: CPT

## 2025-06-06 RX ORDER — NAPROXEN SODIUM 275 MG
1 TABLET ORAL
Qty: 14 | Refills: 0
Start: 2025-06-06 | End: 2025-06-12

## 2025-06-06 RX ORDER — TRAMADOL HYDROCHLORIDE 50 MG/1
1 TABLET, FILM COATED ORAL
Qty: 14 | Refills: 0
Start: 2025-06-06 | End: 2025-06-12

## 2025-06-06 RX ORDER — METHOCARBAMOL 500 MG/1
1 TABLET, FILM COATED ORAL
Qty: 21 | Refills: 0
Start: 2025-06-06 | End: 2025-06-12

## 2025-06-06 RX ORDER — HYDROMORPHONE/SOD CHLOR,ISO/PF 2 MG/10 ML
1 SYRINGE (ML) INJECTION ONCE
Refills: 0 | Status: DISCONTINUED | OUTPATIENT
Start: 2025-06-06 | End: 2025-06-06

## 2025-06-06 RX ORDER — TRAMADOL HYDROCHLORIDE 50 MG/1
50 TABLET, FILM COATED ORAL ONCE
Refills: 0 | Status: DISCONTINUED | OUTPATIENT
Start: 2025-06-06 | End: 2025-06-06

## 2025-06-06 RX ADMIN — Medication 1000 MILLILITER(S): at 00:59

## 2025-06-06 RX ADMIN — DIAZEPAM 5 MILLIGRAM(S): 5 TABLET ORAL at 00:12

## 2025-06-06 RX ADMIN — KETOROLAC TROMETHAMINE 15 MILLIGRAM(S): 30 INJECTION, SOLUTION INTRAMUSCULAR; INTRAVENOUS at 00:13

## 2025-06-06 RX ADMIN — KETOROLAC TROMETHAMINE 15 MILLIGRAM(S): 30 INJECTION, SOLUTION INTRAMUSCULAR; INTRAVENOUS at 01:29

## 2025-06-06 RX ADMIN — Medication 1 MILLIGRAM(S): at 00:59

## 2025-06-06 RX ADMIN — Medication 1 MILLIGRAM(S): at 01:29

## 2025-06-06 RX ADMIN — TRAMADOL HYDROCHLORIDE 50 MILLIGRAM(S): 50 TABLET, FILM COATED ORAL at 05:37

## 2025-06-06 NOTE — ED ADULT NURSE NOTE - NSFALLOOBATTEMPT_ED_ALL_ED
EMERGENCY DEPARTMENT MD ATTESTATION NOTE    Room Number:  02/02  PCP: Provider, No Known  Independent Historians: Patient and EMS    HPI:  A complete HPI/ROS/PMH/PSH/SH/FH are unobtainable due to: Poor historian    Chronic or social conditions impacting patient care (Social Determinants of Health): None      Context: Pro Mueller is a 65 y.o. male with a medical history of alcohol abuse, arthritis, kidney stones, hyperlipidemia, hypertension and anxiety who presents to the ED c/o acute abdominal pain after recent gallbladder surgery last Friday.  Patient complains primarily of pain in the right lower quadrant area.  He has not any fevers that he is aware of.  Denies any dysuria or hematuria.  Denies any nausea or vomiting.  Says his bowels have been moving but a little bit slower than normal.      Review of prior external notes (non-ED) -and- Review of prior external test results outside of this encounter: I independently reviewed the operation note from September 22, 2024 when patient had a laparoscopic cholecystectomy with cholangiogram and da Jordyn robot assistance.    Prescription drug monitoring program review: JAMEY reviewed by Jimy Grijalva MD         PHYSICAL EXAM    I have reviewed the triage vital signs and nursing notes.    ED Triage Vitals   Temp Heart Rate Resp BP SpO2   10/01/24 0729 10/01/24 0725 10/01/24 0725 10/01/24 0725 10/01/24 0725   99.2 °F (37.3 °C) 78 16 158/88 95 %      Temp src Heart Rate Source Patient Position BP Location FiO2 (%)   10/01/24 0729 10/01/24 0725 -- -- --   Tympanic Monitor          Physical Exam  GENERAL: alert, no acute distress  SKIN: Warm, dry  HENT: Normocephalic, atraumatic  EYES: no scleral icterus  CV: regular rhythm, regular rate  RESPIRATORY: normal effort, lungs clear  ABDOMEN: soft, nondistended, mild tenderness in the right mid and right lower quadrant areas.  No peritoneal features evident.  No masses palpable.  Wounds appear to be well-healing without  any sign of erythema or induration.  MUSCULOSKELETAL: no deformity, no edema or asymmetry of extremities  NEURO: alert, moves all extremities, follows commands      MEDICATIONS GIVEN IN ER  Medications   morphine injection 4 mg (4 mg Intravenous Given 10/1/24 0759)   ondansetron (ZOFRAN) injection 4 mg (4 mg Intravenous Given 10/1/24 0759)   sodium chloride 0.9 % bolus 500 mL (0 mL Intravenous Stopped 10/1/24 0953)   iopamidol (ISOVUE-300) 61 % injection 100 mL (85 mL Intravenous Given by Other 10/1/24 0901)   morphine injection 2 mg (2 mg Intravenous Given 10/1/24 0959)         ORDERS PLACED DURING THIS VISIT:  Orders Placed This Encounter   Procedures    CT Abdomen Pelvis With Contrast    [place] Draw    Comprehensive Metabolic Panel    Lipase    Urinalysis With Microscopic If Indicated (No Culture) - Urine, Clean Catch    Lactic Acid, Plasma    CBC Auto Differential    Urinalysis, Microscopic Only - Urine, Clean Catch    CBC & Differential    Green Top (Gel)    Lavender Top    Gold Top - SST    Light Blue Top         PROCEDURES  Procedures        PROGRESS, DATA ANALYSIS, CONSULTS, AND MEDICAL DECISION MAKING  All labs have been independently interpreted by me.  All radiology studies have been reviewed by me. All EKG's have been independently viewed and interpreted by me.  Discussion below represents my analysis of pertinent findings related to patient's condition, differential diagnosis, treatment plan and final disposition.    Differential diagnosis includes but is not limited to ileus, bowel obstruction, postoperative abscess, diverticulitis.    Clinical Scores:                   ED Course as of 10/02/24 2105   Tue Oct 01, 2024   0816 WBC: 8.48 [DC]   0954 I discussed the case with MD berenice with gen Whitaker at this time regarding the patient.  I discussed work-up, results, concerns.  I discussed the consulting provider's desire for discharging the patient to follow-up in the office with Dr. Lerma this week.   "He has viewed the patient CT scans himself and reports that he has no concerns.   [DC]   1003 Patient presentation and evaluation consistent with normal postoperative recovery pain with reassuring ED evaluation and no indication for admission at this time.  Plan for symptom control with Tylenol and ibuprofen at home while he recovers and follows up with Dr. Lrema this week for further management. [DC]      ED Course User Index  [DC] Shilo Guillermo, PA       MDM:   I independently interpreted the abdomen CT study and my findings are: No free air, no small bowel obstruction pattern  I have reviewed all the labs from today's ED visit.  Patient's white blood cell count is normal.  The lactic acid level is also normal.  He is afebrile and nontoxic-appearing.  Overall, the liver function tests are nonworrisome.  He has a normal total bilirubin.  There is no evidence of acute biliary obstruction process.  There is some ketones in the urine which may be consistent with some mild dehydration.  We gave some IV fluids as well as morphine and Zofran here.  He was feeling a little bit better afterwards.  We did discuss with the general surgery specialist on-call he reviewed the test results with us.  There did not appear to be any kind of postoperative complication.  Patient was deemed stable for discharge home and continue symptomatic management.  Will discuss with him the usual \"return to ER\" instructions prior to discharge.    COMPLEXITY OF CARE  Admission was considered but after careful review of the patient's presentation, physical examination, diagnostic results, and response to treatment the patient may be safely discharged with outpatient follow-up.    Please note that portions of this document were completed with a voice recognition program.    Note Disclaimer: At [facility], we believe that sharing information builds trust and better relationships. You are receiving this note because you recently visited " [facility]. It is possible you will see health information before a provider has talked with you about it. This kind of information can be easy to misunderstand. To help you fully understand what it means for your health, we urge you to discuss this note with your provider.       Jimy Grijalva MD  10/02/24 6717     No

## 2025-06-06 NOTE — ED PROVIDER NOTE - CARE PROVIDER_API CALL
Sel[facility]  Orthopaedic Surgery  [address]  [place] [address]  Phone: [phone]  Fax: [phone]  Follow Up Time:

## 2025-06-06 NOTE — ED ADULT NURSE NOTE - OBJECTIVE STATEMENT
Pt is a 55 year old male coming in with complaints of worsening left lower leg pain since Friday. Pt states ever since his fall a couple years ago in which he injured his low back, he has been having chronic lower back pain with intermittent pain down his left leg. Pt denies any urinary complaints, no fevers, no CP, no SOB. No new trauma to the area. Pt notes that his pain is worse with weight bearing to the point where he can no longer walk d//t the pain. Pt has been taking home medications and just started steroids today with little alleviation. No hx of spinal surgeries

## 2025-06-06 NOTE — ED PROVIDER NOTE - CLINICAL SUMMARY MEDICAL DECISION MAKING FREE TEXT BOX
54 yo male with h/o HTN, lumbar radiculopathy, present to ER c/o worsening of his chronic lower back pain, radiating down his left leg.   Pt  states he has been following up with  and soon will also f/u with pain management. Pt reports his recent MRI shows multiple herniated discs. Pt denies urinary or bowel incontinence, denies weakness to his legs, denies abdominal or flank pain, denies any new injury to his back, denies fever or chills.  Pt afebrile, appears in distress due to pain.   No red flags on exam, no findings to suspect any infectious etiology.  plan for muscle relaxant, NSAIds, pain meds, re-evaluate.

## 2025-06-06 NOTE — ED PROVIDER NOTE - MUSCULOSKELETAL, MLM
Spine and all extremities grossly appears normal, left straight leg rise+, diffuse tenderness over the lumbar region,  b/l LE good pulses, sensory and neuro intact, symmetrical

## 2025-06-06 NOTE — ED PROVIDER NOTE - PROGRESS NOTE DETAILS
results discussed with pt.  chronic spinal changes, no acute injury, no spinal cord compression. pt reports improvement with his pain. will d/c home with muscle relaxants and pain meds. F/u with Ashley Langford and  as scheduled.

## 2025-06-06 NOTE — ED ADULT NURSE REASSESSMENT NOTE - NS ED NURSE REASSESS COMMENT FT1
Pt states pain has improved after the dilaudid, now 4/10 in severity. Pt will now be going to CT scan

## 2025-06-06 NOTE — ED PROVIDER NOTE - ATTENDING APP SHARED VISIT CONTRIBUTION OF CARE
54 yo M w PMH of HTN, lumbar radiculopathy p/w worsening chronic lower back pain radiating down left leg.  Recent MRI shows multiple herniated discs. No neurologic deficits. Plan: muscle relaxant, NSAIDs, pain medication, reassessment.

## 2025-06-06 NOTE — ED PROVIDER NOTE - NSFOLLOWUPINSTRUCTIONS_ED_ALL_ED_FT
Lumbar Radiculopathy    WHAT YOU NEED TO KNOW:    What is lumbar radiculopathy? Lumbar radiculopathy is a painful condition that happens when a nerve in your lumbar spine (lower back) is pinched or irritated.  Vertebral Column    What causes lumbar radiculopathy? You may get a pinched nerve in your lumbar spine if you have disc damage. Discs are natural, spongy cushions between your vertebrae (back bones) that allow your spine to move. Your discs may move out of place and pinch the nerve in your spine. Any of the following can increase your risk for a pinched nerve and lumbar radiculopathy:    Diabetes, a spinal infection, or a growth in your spine    Extra body weight    Being male or an older adult    Cigarette use  What are the signs and symptoms of lumbar radiculopathy? You may have any of the following:    Pain that moves from your lower back to your buttocks, groin, and the back of your leg    Pain felt below your knee    Pain that worsens when you cough, sneeze, stand, or sit    Numbness, weakness, or tingling in your back or legs  How is lumbar radiculopathy diagnosed? Your healthcare provider will examine you and ask about your family history of back and leg pain. Your provider may also test you for weakness, numbness, or tingling in your back, buttocks, and legs. You may be asked to lie on your back and lift your leg to locate your pain. You may also need any of the following:    MRI or CT scan pictures may show problems or changes in your back bones, nerves, and discs. Contrast liquid may be used to help problems show up better in the pictures. Tell the healthcare provider if you have ever had an allergic reaction to contrast liquid. Do not enter the MRI room with anything metal. Metal can cause serious injury. Tell the healthcare provider if you have any metal in or on your body.    X-ray pictures show signs of infection or other problems with your spine.    An electromyography (EMG) test measures the electrical activity of your muscles at rest and with movement.  How is lumbar radiculopathy treated? Ask your healthcare provider for more information about these and other treatments for lumbar radiculopathy:    Medicines:  NSAIDs, such as ibuprofen, help decrease swelling, pain, and fever. This medicine is available with or without a doctor's order. NSAIDs can cause stomach bleeding or kidney problems in certain people. If you take blood thinner medicine, always ask your healthcare provider if NSAIDs are safe for you. Always read the medicine label and follow directions.    Muscle relaxers help decrease pain and muscle spasms.    Prescription pain medicine may be given. Ask your healthcare provider how to take this medicine safely. Some prescription pain medicines contain acetaminophen. Do not take other medicines that contain acetaminophen without talking to your healthcare provider. Too much acetaminophen may cause liver damage. Prescription pain medicine may cause constipation. Ask your healthcare provider how to prevent or treat constipation.    Steroid pills may help reduce swelling and pain.    Steroid injections into your lumbar spine may help decrease your nerve pain and swelling. You may need more than 1 injection if your symptoms do not improve after the first treatment.    Physical therapy may be used to improve your posture (the way you stand and sit), flexibility, and the strength in your lower back. Your physical therapist may also teach you how to remain safely active and prevent more injury.    Transcutaneous electrical nerve stimulation (TENS) stimulates nerves and may decrease your pain. Wires are attached to pads. The pads are attached to your skin. The wires send a mild current through your nerves.    Surgery may relieve your pinched nerve if your condition has not improved within 4 to 6 weeks. You may also need surgery if you have lumbar radiculopathy more than 1 time.  What can I do to manage or prevent lumbar radiculopathy?    Apply ice or heat. Ice and heat can help relieve pain or swelling. Put ice in a plastic bag covered with a towel on your low back. Apply ice for 15 to 20 minutes at a time, or as directed. Cover heated items with a towel to avoid burns. You can also use a heating pad on a low setting. Apply heat for 20 minutes at a time. Your provider may tell you to alternate ice and heat.    Stay active. Your healthcare provider may tell you to take walks to ease yourself back into your daily routine. Avoid long periods of bed rest. Bed rest could worsen your symptoms. Do not move in ways that increase your pain. Ask your healthcare provider for more information about the best ways to stay active.  [employer] Walking for Exercise      Do not lift anything heavy. Heavy objects put a strain on your back and may make your symptoms worse.    Maintain a healthy weight. Extra body weight may strain your back. Ask your provider what a healthy weight is for you. Your provider can help you create a safe weight loss plan, if needed.    Do not smoke. Nicotine and other chemicals in cigarettes and cigars increase your risk for lumbar radiculopathy. Ask your provider for information if you currently smoke and need help to quit. E-cigarettes and smokeless tobacco still contain nicotine. Talk to your healthcare provider before you use these products.  When should I seek immediate care?    You have a fever greater than 100.4°F (38°C) for longer than 2 days.    You have new, severe back or leg pain, or your pain spreads to both legs.    You have any new signs of numbness or weakness, especially in your lower back, legs, arms, or genital area.    You have new trouble controlling your urine and bowel movements.    You do not feel like your bladder empties when you urinate.  When should I call my doctor?    Your pain does not improve within 1 to 3 weeks of treatment.    Your pain and weakness keep you from your normal activities at work, home, or school.    You lose more than 10 pounds in 6 months without trying.    You become depressed or sad because of the pain.    You have questions or concerns about your condition or care.        Sciatica  Back view of a person showing a normal leg and a leg affected by the pain of sciatica.  Sciatica is pain, numbness, weakness, or tingling along the path of the sciatic nerve. The sciatic nerve starts in the lower back and runs down the back of each leg. The nerve controls the muscles in the lower leg and in the back of the knee. It also provides feeling (sensation) to the back of the thigh, the lower leg, and the sole of the foot. Sciatica is a symptom of another medical condition that pinches or puts pressure on the sciatic nerve.    Sciatica most often only affects one side of the body. Sciatica usually goes away on its own or with treatment. In some cases, sciatica may come back (recur).    What are the causes?  This condition is caused by pressure on the sciatic nerve or pinching of the nerve. This may be the result of:  A disk in between the bones of the spine bulging out too far (herniated disk).  Age-related changes in the spinal disks.  A pain disorder that affects a muscle in the buttock.  Extra bone growth near the sciatic nerve.  A break (fracture) of the pelvis.  Pregnancy.  Tumor. This is rare.  What increases the risk?  The following factors may make you more likely to develop this condition:  Playing sports that place pressure or stress on the spine.  Having poor strength and flexibility.  A history of back injury or surgery.  Sitting for long periods of time.  Doing activities that involve repetitive bending or lifting.  Obesity.  What are the signs or symptoms?  Symptoms can vary from mild to very severe. They may include:  Any of the following problems in the lower back, leg, hip, or buttock:  Mild tingling, numbness, or dull aches.  Burning sensations.  Sharp pains.  Numbness in the back of the calf or the sole of the foot.  Leg weakness.  Severe back pain that makes movement difficult.  Symptoms may get worse when you cough, sneeze, or laugh, or when you sit or stand for long periods of time.    How is this diagnosed?  This condition may be diagnosed based on:  Your symptoms and medical history.  A physical exam.  Blood tests.  Imaging tests, such as:  X-rays.  An MRI.  A CT scan.  How is this treated?  In many cases, this condition improves on its own without treatment. However, treatment may include:  Reducing or modifying physical activity.  Exercising, including strengthening and stretching.  Icing and applying heat to the affected area.  Medicines that help to:  Relieve pain and swelling.  Relax your muscles.  Injections of medicines that help to relieve pain and inflammation (steroids) around the sciatic nerve.  Surgery.  Follow these instructions at home:  Medicines    Take over-the-counter and prescription medicines only as told by your health care provider.  Ask your health care provider if the medicine prescribed to you requires you to avoid driving or using heavy machinery.  Managing pain    Bag of ice on a towel on the skin.  A heating pad for use on the affected area.  If directed, put ice on the affected area. To do this:  Put ice in a plastic bag.  Place a towel between your skin and the bag.  Leave the ice on for 20 minutes, 2–3 times a day.  If your skin turns bright red, remove the ice right away to prevent skin damage. The risk of skin damage is higher if you cannot feel pain, heat, or cold.  If directed, apply heat to the affected area as often as told by your health care provider. Use the heat source that your health care provider recommends, such as a moist heat pack or a heating pad.  Place a towel between your skin and the heat source.  Leave the heat on for 20–30 minutes.  If your skin turns bright red, remove the heat right away to prevent burns. The risk of burns is higher if you cannot feel pain, heat, or cold.  Activity    A comparison showing the right and wrong way to lift a heavy object.  Return to your normal activities as told by your health care provider. Ask your health care provider what activities are safe for you.  Avoid activities that make your symptoms worse.  Take brief periods of rest throughout the day.  When you rest for longer periods, mix in some mild activity or stretching between periods of rest. This will help to prevent stiffness and pain.  Avoid sitting for long periods of time without moving. Get up and move around at least one time each hour.  Exercise and stretch regularly as told by your health care provider.  Do not lift anything that is heavier than 10 lb (4.5 kg) until your health care provider says that it is safe. When you do not have symptoms, you should still avoid heavy lifting, especially repetitive heavy lifting.  When you lift objects, always use proper lifting technique, which includes:  Bending your knees.  Keeping the load close to your body.  Avoiding twisting.  General instructions    Maintain a healthy weight. Excess weight puts extra stress on your back.  Wear supportive, comfortable shoes. Avoid wearing high heels.  Avoid sleeping on a mattress that is too soft or too hard. A mattress that is firm enough to support your back when you sleep may help to reduce your pain.  Contact a health care provider if:  Your pain is not controlled by medicine.  Your pain does not improve or gets worse.  Your pain lasts longer than 4 weeks.  You have unexplained weight loss.  Get help right away if:  You are not able to control when you urinate or have bowel movements (incontinence).  You have:  Weakness in your lower back, pelvis, buttocks, or legs that gets worse.  Redness or swelling of your back.  A burning sensation when you urinate.  Summary  Sciatica is pain, numbness, weakness, or tingling along the path of the sciatic nerve, which may include the lower back, legs, hips, and buttocks.  This condition is caused by pressure on the sciatic nerve or pinching of the nerve.  Treatment often includes rest, exercise, medicines, and applying ice or heat.  This information is not intended to replace advice given to you by your health care provider. Make sure you discuss any questions you have with your health care provider.

## 2025-06-06 NOTE — ED PROVIDER NOTE - ATTENDING SHARED VISIT SELECTOR YES
----- Message from REYES Rai, KEO sent at 2/22/2023  3:19 PM CST -----  A1c lipid prior - 4 months   Follow up in 4 months- inderjit  May be duplicate    Yes

## 2025-06-06 NOTE — ED PROVIDER NOTE - CARE PROVIDERS DIRECT ADDRESSES
[email or web address]

## 2025-06-06 NOTE — ED ADULT NURSE NOTE - NS_ED_NURSE_TEACHING_TOPIC_ED_A_ED
Isha is a 21 year old year old female here for an OB check.  She is      .  Gestational Age: 38w4d.  Concerns today include:  Seen in the ER last night on and off contractions and she felt like she was leaking fluid.    She reports fetal movement.   She denies bleeding.  She reports cramping.  She reports nausea.    No U/A was needed for today's visit per provider.    If your visit includes an exam today, would you like an assistant to be present in the room during that time?  No    Orthopedic

## 2025-06-06 NOTE — ED PROVIDER NOTE - OBJECTIVE STATEMENT
54 yo male with h/o HTN, lumbar radiculopathy, present to ER c/o worsening of his chronic lower back pain, radiating down his left leg.   Pt  states he has been following up with  and soon will also f/u with pain management. Pt reports his recent MRI shows multiple herniated discs. Pt denies urinary or bowel incontinence, denies weakness to his legs, denies abdominal or flank pain, denies any new injury o his back, denies fever or chills.

## 2025-06-07 ENCOUNTER — INPATIENT (INPATIENT)
Facility: HOSPITAL | Age: 55
LOS: 6 days | Discharge: HOME HEALTH | DRG: 402 | End: 2025-06-14
Attending: ORTHOPAEDIC SURGERY | Admitting: ORTHOPAEDIC SURGERY
Payer: OTHER MISCELLANEOUS

## 2025-06-07 VITALS
HEIGHT: 68 IN | OXYGEN SATURATION: 95 % | SYSTOLIC BLOOD PRESSURE: 177 MMHG | DIASTOLIC BLOOD PRESSURE: 120 MMHG | RESPIRATION RATE: 16 BRPM | HEART RATE: 67 BPM | WEIGHT: 214.95 LBS | TEMPERATURE: 98 F

## 2025-06-07 LAB
ANION GAP SERPL CALC-SCNC: 12 MMOL/L — SIGNIFICANT CHANGE UP (ref 5–17)
APTT BLD: 29.4 SEC — SIGNIFICANT CHANGE UP (ref 26.1–36.8)
BUN SERPL-MCNC: 18 MG/DL — SIGNIFICANT CHANGE UP (ref 7–23)
CALCIUM SERPL-MCNC: 9.6 MG/DL — SIGNIFICANT CHANGE UP (ref 8.4–10.5)
CHLORIDE SERPL-SCNC: 100 MMOL/L — SIGNIFICANT CHANGE UP (ref 96–108)
CO2 SERPL-SCNC: 24 MMOL/L — SIGNIFICANT CHANGE UP (ref 22–31)
CREAT SERPL-MCNC: 0.92 MG/DL — SIGNIFICANT CHANGE UP (ref 0.5–1.3)
EGFR: 97 ML/MIN/1.73M2 — SIGNIFICANT CHANGE UP
EGFR: 97 ML/MIN/1.73M2 — SIGNIFICANT CHANGE UP
GLUCOSE SERPL-MCNC: 127 MG/DL — HIGH (ref 70–99)
HCT VFR BLD CALC: 43.4 % — SIGNIFICANT CHANGE UP (ref 39–50)
HGB BLD-MCNC: 15.5 G/DL — SIGNIFICANT CHANGE UP (ref 13–17)
INR BLD: 1.01 — SIGNIFICANT CHANGE UP (ref 0.85–1.16)
MCHC RBC-ENTMCNC: 32.4 PG — SIGNIFICANT CHANGE UP (ref 27–34)
MCHC RBC-ENTMCNC: 35.7 G/DL — SIGNIFICANT CHANGE UP (ref 32–36)
MCV RBC AUTO: 90.6 FL — SIGNIFICANT CHANGE UP (ref 80–100)
NRBC BLD AUTO-RTO: 0 /100 WBCS — SIGNIFICANT CHANGE UP (ref 0–0)
PLATELET # BLD AUTO: 272 K/UL — SIGNIFICANT CHANGE UP (ref 150–400)
POTASSIUM SERPL-MCNC: 4.5 MMOL/L — SIGNIFICANT CHANGE UP (ref 3.5–5.3)
POTASSIUM SERPL-SCNC: 4.5 MMOL/L — SIGNIFICANT CHANGE UP (ref 3.5–5.3)
PROTHROM AB SERPL-ACNC: 11.8 SEC — SIGNIFICANT CHANGE UP (ref 9.9–13.4)
RBC # BLD: 4.79 M/UL — SIGNIFICANT CHANGE UP (ref 4.2–5.8)
RBC # FLD: 12.3 % — SIGNIFICANT CHANGE UP (ref 10.3–14.5)
SODIUM SERPL-SCNC: 136 MMOL/L — SIGNIFICANT CHANGE UP (ref 135–145)
WBC # BLD: 6.71 K/UL — SIGNIFICANT CHANGE UP (ref 3.8–10.5)
WBC # FLD AUTO: 6.71 K/UL — SIGNIFICANT CHANGE UP (ref 3.8–10.5)

## 2025-06-07 PROCEDURE — 72148 MRI LUMBAR SPINE W/O DYE: CPT | Mod: 26

## 2025-06-07 PROCEDURE — 93010 ELECTROCARDIOGRAM REPORT: CPT

## 2025-06-07 PROCEDURE — 99285 EMERGENCY DEPT VISIT HI MDM: CPT

## 2025-06-07 RX ORDER — OXYCODONE HYDROCHLORIDE 30 MG/1
5 TABLET ORAL EVERY 4 HOURS
Refills: 0 | Status: DISCONTINUED | OUTPATIENT
Start: 2025-06-07 | End: 2025-06-13

## 2025-06-07 RX ORDER — OXYCODONE HYDROCHLORIDE 30 MG/1
10 TABLET ORAL EVERY 4 HOURS
Refills: 0 | Status: DISCONTINUED | OUTPATIENT
Start: 2025-06-07 | End: 2025-06-12

## 2025-06-07 RX ORDER — AMLODIPINE BESYLATE 10 MG/1
5 TABLET ORAL DAILY
Refills: 0 | Status: DISCONTINUED | OUTPATIENT
Start: 2025-06-07 | End: 2025-06-08

## 2025-06-07 RX ORDER — ACETAMINOPHEN 500 MG/5ML
975 LIQUID (ML) ORAL ONCE
Refills: 0 | Status: COMPLETED | OUTPATIENT
Start: 2025-06-07 | End: 2025-06-07

## 2025-06-07 RX ORDER — HYDROMORPHONE/SOD CHLOR,ISO/PF 2 MG/10 ML
0.5 SYRINGE (ML) INJECTION ONCE
Refills: 0 | Status: DISCONTINUED | OUTPATIENT
Start: 2025-06-07 | End: 2025-06-07

## 2025-06-07 RX ORDER — AMLODIPINE BESYLATE 10 MG/1
5 TABLET ORAL ONCE
Refills: 0 | Status: COMPLETED | OUTPATIENT
Start: 2025-06-07 | End: 2025-06-07

## 2025-06-07 RX ORDER — DEXAMETHASONE 0.5 MG/1
6 TABLET ORAL EVERY 6 HOURS
Refills: 0 | Status: DISCONTINUED | OUTPATIENT
Start: 2025-06-07 | End: 2025-06-11

## 2025-06-07 RX ORDER — ACETAMINOPHEN 500 MG/5ML
1000 LIQUID (ML) ORAL EVERY 8 HOURS
Refills: 0 | Status: DISCONTINUED | OUTPATIENT
Start: 2025-06-07 | End: 2025-06-14

## 2025-06-07 RX ORDER — METHOCARBAMOL 500 MG/1
500 TABLET, FILM COATED ORAL EVERY 8 HOURS
Refills: 0 | Status: DISCONTINUED | OUTPATIENT
Start: 2025-06-07 | End: 2025-06-14

## 2025-06-07 RX ORDER — KETOROLAC TROMETHAMINE 30 MG/ML
15 INJECTION, SOLUTION INTRAMUSCULAR; INTRAVENOUS ONCE
Refills: 0 | Status: DISCONTINUED | OUTPATIENT
Start: 2025-06-07 | End: 2025-06-07

## 2025-06-07 RX ORDER — PREGABALIN 50 MG/1
50 CAPSULE ORAL
Refills: 0 | Status: COMPLETED | OUTPATIENT
Start: 2025-06-07 | End: 2025-06-14

## 2025-06-07 RX ORDER — CYCLOBENZAPRINE HYDROCHLORIDE 15 MG/1
10 CAPSULE, EXTENDED RELEASE ORAL ONCE
Refills: 0 | Status: COMPLETED | OUTPATIENT
Start: 2025-06-07 | End: 2025-06-07

## 2025-06-07 RX ORDER — HYDROMORPHONE/SOD CHLOR,ISO/PF 2 MG/10 ML
0.5 SYRINGE (ML) INJECTION EVERY 6 HOURS
Refills: 0 | Status: DISCONTINUED | OUTPATIENT
Start: 2025-06-07 | End: 2025-06-11

## 2025-06-07 RX ORDER — DEXAMETHASONE 0.5 MG/1
10 TABLET ORAL ONCE
Refills: 0 | Status: COMPLETED | OUTPATIENT
Start: 2025-06-07 | End: 2025-06-07

## 2025-06-07 RX ORDER — LIDOCAINE HYDROCHLORIDE 20 MG/ML
1 JELLY TOPICAL ONCE
Refills: 0 | Status: COMPLETED | OUTPATIENT
Start: 2025-06-07 | End: 2025-06-07

## 2025-06-07 RX ADMIN — DEXAMETHASONE 102 MILLIGRAM(S): 0.5 TABLET ORAL at 15:25

## 2025-06-07 RX ADMIN — CYCLOBENZAPRINE HYDROCHLORIDE 10 MILLIGRAM(S): 15 CAPSULE, EXTENDED RELEASE ORAL at 14:33

## 2025-06-07 RX ADMIN — DEXAMETHASONE 6 MILLIGRAM(S): 0.5 TABLET ORAL at 23:00

## 2025-06-07 RX ADMIN — METHOCARBAMOL 500 MILLIGRAM(S): 500 TABLET, FILM COATED ORAL at 21:15

## 2025-06-07 RX ADMIN — Medication 0.5 MILLIGRAM(S): at 15:25

## 2025-06-07 RX ADMIN — Medication 0.5 MILLIGRAM(S): at 22:49

## 2025-06-07 RX ADMIN — Medication 975 MILLIGRAM(S): at 15:24

## 2025-06-07 RX ADMIN — Medication 0.5 MILLIGRAM(S): at 14:34

## 2025-06-07 RX ADMIN — AMLODIPINE BESYLATE 5 MILLIGRAM(S): 10 TABLET ORAL at 14:33

## 2025-06-07 RX ADMIN — DEXAMETHASONE 6 MILLIGRAM(S): 0.5 TABLET ORAL at 18:27

## 2025-06-07 RX ADMIN — KETOROLAC TROMETHAMINE 15 MILLIGRAM(S): 30 INJECTION, SOLUTION INTRAMUSCULAR; INTRAVENOUS at 14:34

## 2025-06-07 RX ADMIN — LIDOCAINE HYDROCHLORIDE 1 PATCH: 20 JELLY TOPICAL at 14:33

## 2025-06-07 RX ADMIN — PREGABALIN 50 MILLIGRAM(S): 50 CAPSULE ORAL at 19:23

## 2025-06-07 RX ADMIN — OXYCODONE HYDROCHLORIDE 10 MILLIGRAM(S): 30 TABLET ORAL at 21:22

## 2025-06-07 RX ADMIN — Medication 0.5 MILLIGRAM(S): at 22:24

## 2025-06-07 RX ADMIN — OXYCODONE HYDROCHLORIDE 10 MILLIGRAM(S): 30 TABLET ORAL at 20:22

## 2025-06-07 RX ADMIN — Medication 1000 MILLIGRAM(S): at 21:16

## 2025-06-08 PROCEDURE — 71045 X-RAY EXAM CHEST 1 VIEW: CPT | Mod: 26

## 2025-06-08 PROCEDURE — 99255 IP/OBS CONSLTJ NEW/EST HI 80: CPT

## 2025-06-08 RX ORDER — POLYETHYLENE GLYCOL 3350 17 G/17G
17 POWDER, FOR SOLUTION ORAL DAILY
Refills: 0 | Status: DISCONTINUED | OUTPATIENT
Start: 2025-06-08 | End: 2025-06-14

## 2025-06-08 RX ORDER — DEXTROSE 50 % IN WATER 50 %
25 SYRINGE (ML) INTRAVENOUS ONCE
Refills: 0 | Status: DISCONTINUED | OUTPATIENT
Start: 2025-06-08 | End: 2025-06-14

## 2025-06-08 RX ORDER — AMLODIPINE BESYLATE 10 MG/1
10 TABLET ORAL DAILY
Refills: 0 | Status: DISCONTINUED | OUTPATIENT
Start: 2025-06-09 | End: 2025-06-11

## 2025-06-08 RX ORDER — DEXTROSE 50 % IN WATER 50 %
12.5 SYRINGE (ML) INTRAVENOUS ONCE
Refills: 0 | Status: DISCONTINUED | OUTPATIENT
Start: 2025-06-08 | End: 2025-06-14

## 2025-06-08 RX ORDER — APREPITANT 40 MG/1
40 CAPSULE ORAL ONCE
Refills: 0 | Status: DISCONTINUED | OUTPATIENT
Start: 2025-06-08 | End: 2025-06-09

## 2025-06-08 RX ORDER — DEXTROSE 50 % IN WATER 50 %
15 SYRINGE (ML) INTRAVENOUS ONCE
Refills: 0 | Status: DISCONTINUED | OUTPATIENT
Start: 2025-06-08 | End: 2025-06-14

## 2025-06-08 RX ORDER — SODIUM CHLORIDE 9 G/1000ML
1000 INJECTION, SOLUTION INTRAVENOUS
Refills: 0 | Status: DISCONTINUED | OUTPATIENT
Start: 2025-06-08 | End: 2025-06-14

## 2025-06-08 RX ORDER — SENNA 187 MG
2 TABLET ORAL AT BEDTIME
Refills: 0 | Status: DISCONTINUED | OUTPATIENT
Start: 2025-06-08 | End: 2025-06-14

## 2025-06-08 RX ORDER — INSULIN LISPRO 100 U/ML
INJECTION, SOLUTION INTRAVENOUS; SUBCUTANEOUS
Refills: 0 | Status: DISCONTINUED | OUTPATIENT
Start: 2025-06-08 | End: 2025-06-14

## 2025-06-08 RX ORDER — POVIDONE-IODINE 7.5 %
1 SOLUTION, NON-ORAL TOPICAL ONCE
Refills: 0 | Status: DISCONTINUED | OUTPATIENT
Start: 2025-06-08 | End: 2025-06-14

## 2025-06-08 RX ORDER — GLUCAGON 3 MG/1
1 POWDER NASAL ONCE
Refills: 0 | Status: DISCONTINUED | OUTPATIENT
Start: 2025-06-08 | End: 2025-06-14

## 2025-06-08 RX ORDER — INSULIN LISPRO 100 U/ML
INJECTION, SOLUTION INTRAVENOUS; SUBCUTANEOUS AT BEDTIME
Refills: 0 | Status: DISCONTINUED | OUTPATIENT
Start: 2025-06-08 | End: 2025-06-14

## 2025-06-08 RX ADMIN — METHOCARBAMOL 500 MILLIGRAM(S): 500 TABLET, FILM COATED ORAL at 05:28

## 2025-06-08 RX ADMIN — DEXAMETHASONE 6 MILLIGRAM(S): 0.5 TABLET ORAL at 05:27

## 2025-06-08 RX ADMIN — Medication 1000 MILLIGRAM(S): at 13:18

## 2025-06-08 RX ADMIN — OXYCODONE HYDROCHLORIDE 10 MILLIGRAM(S): 30 TABLET ORAL at 05:27

## 2025-06-08 RX ADMIN — PREGABALIN 50 MILLIGRAM(S): 50 CAPSULE ORAL at 17:13

## 2025-06-08 RX ADMIN — Medication 0.5 MILLIGRAM(S): at 11:32

## 2025-06-08 RX ADMIN — Medication 0.5 MILLIGRAM(S): at 21:44

## 2025-06-08 RX ADMIN — DEXAMETHASONE 6 MILLIGRAM(S): 0.5 TABLET ORAL at 23:02

## 2025-06-08 RX ADMIN — INSULIN LISPRO 2: 100 INJECTION, SOLUTION INTRAVENOUS; SUBCUTANEOUS at 17:27

## 2025-06-08 RX ADMIN — Medication 1000 MILLIGRAM(S): at 22:15

## 2025-06-08 RX ADMIN — POLYETHYLENE GLYCOL 3350 17 GRAM(S): 17 POWDER, FOR SOLUTION ORAL at 11:46

## 2025-06-08 RX ADMIN — Medication 1000 MILLIGRAM(S): at 05:27

## 2025-06-08 RX ADMIN — AMLODIPINE BESYLATE 5 MILLIGRAM(S): 10 TABLET ORAL at 05:28

## 2025-06-08 RX ADMIN — DEXAMETHASONE 6 MILLIGRAM(S): 0.5 TABLET ORAL at 17:13

## 2025-06-08 RX ADMIN — OXYCODONE HYDROCHLORIDE 10 MILLIGRAM(S): 30 TABLET ORAL at 09:32

## 2025-06-08 RX ADMIN — PREGABALIN 50 MILLIGRAM(S): 50 CAPSULE ORAL at 05:28

## 2025-06-08 RX ADMIN — Medication 0.5 MILLIGRAM(S): at 10:33

## 2025-06-08 RX ADMIN — METHOCARBAMOL 500 MILLIGRAM(S): 500 TABLET, FILM COATED ORAL at 22:15

## 2025-06-08 RX ADMIN — OXYCODONE HYDROCHLORIDE 10 MILLIGRAM(S): 30 TABLET ORAL at 10:33

## 2025-06-08 RX ADMIN — METHOCARBAMOL 500 MILLIGRAM(S): 500 TABLET, FILM COATED ORAL at 13:18

## 2025-06-08 RX ADMIN — DEXAMETHASONE 6 MILLIGRAM(S): 0.5 TABLET ORAL at 11:48

## 2025-06-08 RX ADMIN — Medication 2 TABLET(S): at 22:15

## 2025-06-08 RX ADMIN — Medication 0.5 MILLIGRAM(S): at 20:44

## 2025-06-08 RX ADMIN — LIDOCAINE HYDROCHLORIDE 1 PATCH: 20 JELLY TOPICAL at 01:32

## 2025-06-08 RX ADMIN — OXYCODONE HYDROCHLORIDE 10 MILLIGRAM(S): 30 TABLET ORAL at 06:27

## 2025-06-08 RX ADMIN — OXYCODONE HYDROCHLORIDE 10 MILLIGRAM(S): 30 TABLET ORAL at 19:43

## 2025-06-09 ENCOUNTER — NON-APPOINTMENT (OUTPATIENT)
Age: 55
End: 2025-06-09

## 2025-06-09 PROBLEM — M51.26 OTHER INTERVERTEBRAL DISC DISPLACEMENT, LUMBAR REGION: Chronic | Status: ACTIVE | Noted: 2025-06-07

## 2025-06-09 PROBLEM — I10 ESSENTIAL (PRIMARY) HYPERTENSION: Chronic | Status: ACTIVE | Noted: 2025-06-07

## 2025-06-09 LAB
A1C WITH ESTIMATED AVERAGE GLUCOSE RESULT: 5.4 % — SIGNIFICANT CHANGE UP (ref 4–5.6)
ANION GAP SERPL CALC-SCNC: 10 MMOL/L — SIGNIFICANT CHANGE UP (ref 5–17)
BLD GP AB SCN SERPL QL: NEGATIVE — SIGNIFICANT CHANGE UP
BUN SERPL-MCNC: 25 MG/DL — HIGH (ref 7–23)
CALCIUM SERPL-MCNC: 9.3 MG/DL — SIGNIFICANT CHANGE UP (ref 8.4–10.5)
CHLORIDE SERPL-SCNC: 100 MMOL/L — SIGNIFICANT CHANGE UP (ref 96–108)
CO2 SERPL-SCNC: 26 MMOL/L — SIGNIFICANT CHANGE UP (ref 22–31)
CREAT SERPL-MCNC: 0.97 MG/DL — SIGNIFICANT CHANGE UP (ref 0.5–1.3)
EGFR: 91 ML/MIN/1.73M2 — SIGNIFICANT CHANGE UP
EGFR: 91 ML/MIN/1.73M2 — SIGNIFICANT CHANGE UP
ESTIMATED AVERAGE GLUCOSE: 108 MG/DL — SIGNIFICANT CHANGE UP (ref 68–114)
GLUCOSE SERPL-MCNC: 126 MG/DL — HIGH (ref 70–99)
HCT VFR BLD CALC: 43.4 % — SIGNIFICANT CHANGE UP (ref 39–50)
HGB BLD-MCNC: 15.3 G/DL — SIGNIFICANT CHANGE UP (ref 13–17)
MCHC RBC-ENTMCNC: 32.2 PG — SIGNIFICANT CHANGE UP (ref 27–34)
MCHC RBC-ENTMCNC: 35.3 G/DL — SIGNIFICANT CHANGE UP (ref 32–36)
MCV RBC AUTO: 91.4 FL — SIGNIFICANT CHANGE UP (ref 80–100)
NRBC BLD AUTO-RTO: 0 /100 WBCS — SIGNIFICANT CHANGE UP (ref 0–0)
PLATELET # BLD AUTO: 283 K/UL — SIGNIFICANT CHANGE UP (ref 150–400)
POTASSIUM SERPL-MCNC: 4.7 MMOL/L — SIGNIFICANT CHANGE UP (ref 3.5–5.3)
POTASSIUM SERPL-SCNC: 4.7 MMOL/L — SIGNIFICANT CHANGE UP (ref 3.5–5.3)
RBC # BLD: 4.75 M/UL — SIGNIFICANT CHANGE UP (ref 4.2–5.8)
RBC # FLD: 12.2 % — SIGNIFICANT CHANGE UP (ref 10.3–14.5)
RH IG SCN BLD-IMP: POSITIVE — SIGNIFICANT CHANGE UP
SODIUM SERPL-SCNC: 136 MMOL/L — SIGNIFICANT CHANGE UP (ref 135–145)
WBC # BLD: 13.85 K/UL — HIGH (ref 3.8–10.5)
WBC # FLD AUTO: 13.85 K/UL — HIGH (ref 3.8–10.5)

## 2025-06-09 PROCEDURE — 99233 SBSQ HOSP IP/OBS HIGH 50: CPT

## 2025-06-09 RX ORDER — MAGNESIUM, ALUMINUM HYDROXIDE 200-200 MG
30 TABLET,CHEWABLE ORAL EVERY 4 HOURS
Refills: 0 | Status: DISCONTINUED | OUTPATIENT
Start: 2025-06-09 | End: 2025-06-14

## 2025-06-09 RX ORDER — APREPITANT 40 MG/1
40 CAPSULE ORAL ONCE
Refills: 0 | Status: COMPLETED | OUTPATIENT
Start: 2025-06-10 | End: 2025-06-10

## 2025-06-09 RX ORDER — ENOXAPARIN SODIUM 100 MG/ML
40 INJECTION SUBCUTANEOUS ONCE
Refills: 0 | Status: COMPLETED | OUTPATIENT
Start: 2025-06-09 | End: 2025-06-09

## 2025-06-09 RX ORDER — POVIDONE-IODINE 7.5 %
1 SOLUTION, NON-ORAL TOPICAL ONCE
Refills: 0 | Status: COMPLETED | OUTPATIENT
Start: 2025-06-10 | End: 2025-06-10

## 2025-06-09 RX ADMIN — Medication 1000 MILLIGRAM(S): at 13:52

## 2025-06-09 RX ADMIN — OXYCODONE HYDROCHLORIDE 10 MILLIGRAM(S): 30 TABLET ORAL at 08:49

## 2025-06-09 RX ADMIN — METHOCARBAMOL 500 MILLIGRAM(S): 500 TABLET, FILM COATED ORAL at 21:48

## 2025-06-09 RX ADMIN — OXYCODONE HYDROCHLORIDE 10 MILLIGRAM(S): 30 TABLET ORAL at 14:56

## 2025-06-09 RX ADMIN — OXYCODONE HYDROCHLORIDE 10 MILLIGRAM(S): 30 TABLET ORAL at 01:11

## 2025-06-09 RX ADMIN — Medication 1 APPLICATION(S): at 07:16

## 2025-06-09 RX ADMIN — OXYCODONE HYDROCHLORIDE 10 MILLIGRAM(S): 30 TABLET ORAL at 19:57

## 2025-06-09 RX ADMIN — OXYCODONE HYDROCHLORIDE 10 MILLIGRAM(S): 30 TABLET ORAL at 09:56

## 2025-06-09 RX ADMIN — DEXAMETHASONE 6 MILLIGRAM(S): 0.5 TABLET ORAL at 12:04

## 2025-06-09 RX ADMIN — OXYCODONE HYDROCHLORIDE 10 MILLIGRAM(S): 30 TABLET ORAL at 00:11

## 2025-06-09 RX ADMIN — SODIUM CHLORIDE 140 MILLILITER(S): 9 INJECTION, SOLUTION INTRAVENOUS at 00:11

## 2025-06-09 RX ADMIN — Medication 1000 MILLIGRAM(S): at 05:24

## 2025-06-09 RX ADMIN — PREGABALIN 50 MILLIGRAM(S): 50 CAPSULE ORAL at 17:50

## 2025-06-09 RX ADMIN — Medication 1000 MILLIGRAM(S): at 21:48

## 2025-06-09 RX ADMIN — OXYCODONE HYDROCHLORIDE 10 MILLIGRAM(S): 30 TABLET ORAL at 23:56

## 2025-06-09 RX ADMIN — DEXAMETHASONE 6 MILLIGRAM(S): 0.5 TABLET ORAL at 05:24

## 2025-06-09 RX ADMIN — Medication 0.5 MILLIGRAM(S): at 13:12

## 2025-06-09 RX ADMIN — ENOXAPARIN SODIUM 40 MILLIGRAM(S): 100 INJECTION SUBCUTANEOUS at 13:52

## 2025-06-09 RX ADMIN — OXYCODONE HYDROCHLORIDE 10 MILLIGRAM(S): 30 TABLET ORAL at 13:57

## 2025-06-09 RX ADMIN — POLYETHYLENE GLYCOL 3350 17 GRAM(S): 17 POWDER, FOR SOLUTION ORAL at 12:03

## 2025-06-09 RX ADMIN — Medication 1000 MILLIGRAM(S): at 14:14

## 2025-06-09 RX ADMIN — AMLODIPINE BESYLATE 10 MILLIGRAM(S): 10 TABLET ORAL at 05:25

## 2025-06-09 RX ADMIN — METHOCARBAMOL 500 MILLIGRAM(S): 500 TABLET, FILM COATED ORAL at 05:25

## 2025-06-09 RX ADMIN — Medication 40 MILLIGRAM(S): at 05:25

## 2025-06-09 RX ADMIN — DEXAMETHASONE 6 MILLIGRAM(S): 0.5 TABLET ORAL at 23:47

## 2025-06-09 RX ADMIN — METHOCARBAMOL 500 MILLIGRAM(S): 500 TABLET, FILM COATED ORAL at 13:53

## 2025-06-09 RX ADMIN — Medication 0.5 MILLIGRAM(S): at 12:03

## 2025-06-09 RX ADMIN — DEXAMETHASONE 6 MILLIGRAM(S): 0.5 TABLET ORAL at 17:50

## 2025-06-09 RX ADMIN — PREGABALIN 50 MILLIGRAM(S): 50 CAPSULE ORAL at 05:25

## 2025-06-09 RX ADMIN — Medication 0.5 MILLIGRAM(S): at 03:36

## 2025-06-09 RX ADMIN — Medication 0.5 MILLIGRAM(S): at 04:36

## 2025-06-09 RX ADMIN — Medication 2 TABLET(S): at 21:48

## 2025-06-10 ENCOUNTER — APPOINTMENT (OUTPATIENT)
Dept: ORTHOPEDIC SURGERY | Facility: HOSPITAL | Age: 55
End: 2025-06-10

## 2025-06-10 ENCOUNTER — TRANSCRIPTION ENCOUNTER (OUTPATIENT)
Age: 55
End: 2025-06-10

## 2025-06-10 PROCEDURE — 99233 SBSQ HOSP IP/OBS HIGH 50: CPT

## 2025-06-10 PROCEDURE — 61783 SCAN PROC SPINAL: CPT | Mod: 59

## 2025-06-10 PROCEDURE — 22853 INSJ BIOMECHANICAL DEVICE: CPT

## 2025-06-10 PROCEDURE — 20939 BONE MARROW ASPIR BONE GRFG: CPT

## 2025-06-10 PROCEDURE — 22840 INSERT SPINE FIXATION DEVICE: CPT

## 2025-06-10 PROCEDURE — 22633 ARTHRD CMBN 1NTRSPC LUMBAR: CPT

## 2025-06-10 PROCEDURE — 63052 LAM FACETC/FRMT ARTHRD LUM 1: CPT

## 2025-06-10 DEVICE — AGENT HEMOSTATIC HEMOBLAST 1.65G 10CM: Type: IMPLANTABLE DEVICE | Status: FUNCTIONAL

## 2025-06-10 DEVICE — SET SCREW EVEREST ATR: Type: IMPLANTABLE DEVICE | Status: FUNCTIONAL

## 2025-06-10 DEVICE — GRAFT VITOSIS BIMODAL 10CC: Type: IMPLANTABLE DEVICE | Status: FUNCTIONAL

## 2025-06-10 DEVICE — SURGIFLO MATRIX WITH THROMBIN KIT: Type: IMPLANTABLE DEVICE | Status: FUNCTIONAL

## 2025-06-10 DEVICE — ROD RAD 45: Type: IMPLANTABLE DEVICE | Status: FUNCTIONAL

## 2025-06-10 DEVICE — SURGIFOAM 8 X 12.5CM X 10MM (100): Type: IMPLANTABLE DEVICE | Status: FUNCTIONAL

## 2025-06-10 DEVICE — SCREW POLY 6.5X45MM: Type: IMPLANTABLE DEVICE | Status: FUNCTIONAL

## 2025-06-10 DEVICE — SURGIFLO HEMOSTATIC MATRIX KIT: Type: IMPLANTABLE DEVICE | Status: FUNCTIONAL

## 2025-06-10 DEVICE — GRAFT BONE INFUSE KIT SM: Type: IMPLANTABLE DEVICE | Status: FUNCTIONAL

## 2025-06-10 RX ORDER — CEFEPIME 2 G/20ML
2000 INJECTION, POWDER, FOR SOLUTION INTRAVENOUS EVERY 8 HOURS
Refills: 0 | Status: COMPLETED | OUTPATIENT
Start: 2025-06-11 | End: 2025-06-11

## 2025-06-10 RX ORDER — ENOXAPARIN SODIUM 100 MG/ML
40 INJECTION SUBCUTANEOUS EVERY 24 HOURS
Refills: 0 | Status: DISCONTINUED | OUTPATIENT
Start: 2025-06-11 | End: 2025-06-14

## 2025-06-10 RX ORDER — BUPIVACAINE 13.3 MG/ML
20 INJECTION, SUSPENSION, LIPOSOMAL INFILTRATION ONCE
Refills: 0 | Status: DISCONTINUED | OUTPATIENT
Start: 2025-06-10 | End: 2025-06-14

## 2025-06-10 RX ADMIN — DEXAMETHASONE 6 MILLIGRAM(S): 0.5 TABLET ORAL at 05:57

## 2025-06-10 RX ADMIN — APREPITANT 40 MILLIGRAM(S): 40 CAPSULE ORAL at 14:15

## 2025-06-10 RX ADMIN — Medication 1000 MILLIGRAM(S): at 14:25

## 2025-06-10 RX ADMIN — OXYCODONE HYDROCHLORIDE 5 MILLIGRAM(S): 30 TABLET ORAL at 21:33

## 2025-06-10 RX ADMIN — SODIUM CHLORIDE 140 MILLILITER(S): 9 INJECTION, SOLUTION INTRAVENOUS at 00:06

## 2025-06-10 RX ADMIN — Medication 2 TABLET(S): at 21:33

## 2025-06-10 RX ADMIN — AMLODIPINE BESYLATE 10 MILLIGRAM(S): 10 TABLET ORAL at 05:56

## 2025-06-10 RX ADMIN — PREGABALIN 50 MILLIGRAM(S): 50 CAPSULE ORAL at 05:56

## 2025-06-10 RX ADMIN — Medication 40 MILLIGRAM(S): at 05:56

## 2025-06-10 RX ADMIN — Medication 1 APPLICATION(S): at 07:05

## 2025-06-10 RX ADMIN — Medication 1000 MILLIGRAM(S): at 14:15

## 2025-06-10 RX ADMIN — Medication 1 APPLICATION(S): at 14:15

## 2025-06-10 RX ADMIN — Medication 1000 MILLIGRAM(S): at 05:56

## 2025-06-10 RX ADMIN — DEXAMETHASONE 6 MILLIGRAM(S): 0.5 TABLET ORAL at 23:55

## 2025-06-10 RX ADMIN — METHOCARBAMOL 500 MILLIGRAM(S): 500 TABLET, FILM COATED ORAL at 21:33

## 2025-06-10 RX ADMIN — METHOCARBAMOL 500 MILLIGRAM(S): 500 TABLET, FILM COATED ORAL at 14:16

## 2025-06-10 RX ADMIN — OXYCODONE HYDROCHLORIDE 5 MILLIGRAM(S): 30 TABLET ORAL at 22:33

## 2025-06-10 RX ADMIN — OXYCODONE HYDROCHLORIDE 10 MILLIGRAM(S): 30 TABLET ORAL at 00:56

## 2025-06-10 RX ADMIN — METHOCARBAMOL 500 MILLIGRAM(S): 500 TABLET, FILM COATED ORAL at 05:56

## 2025-06-10 RX ADMIN — OXYCODONE HYDROCHLORIDE 10 MILLIGRAM(S): 30 TABLET ORAL at 09:35

## 2025-06-10 RX ADMIN — DEXAMETHASONE 6 MILLIGRAM(S): 0.5 TABLET ORAL at 12:47

## 2025-06-11 LAB
ANION GAP SERPL CALC-SCNC: 11 MMOL/L — SIGNIFICANT CHANGE UP (ref 5–17)
BUN SERPL-MCNC: 26 MG/DL — HIGH (ref 7–23)
CALCIUM SERPL-MCNC: 8.2 MG/DL — LOW (ref 8.4–10.5)
CHLORIDE SERPL-SCNC: 103 MMOL/L — SIGNIFICANT CHANGE UP (ref 96–108)
CO2 SERPL-SCNC: 25 MMOL/L — SIGNIFICANT CHANGE UP (ref 22–31)
CREAT SERPL-MCNC: 1.07 MG/DL — SIGNIFICANT CHANGE UP (ref 0.5–1.3)
EGFR: 81 ML/MIN/1.73M2 — SIGNIFICANT CHANGE UP
EGFR: 81 ML/MIN/1.73M2 — SIGNIFICANT CHANGE UP
GLUCOSE SERPL-MCNC: 210 MG/DL — HIGH (ref 70–99)
HCT VFR BLD CALC: 42.6 % — SIGNIFICANT CHANGE UP (ref 39–50)
HGB BLD-MCNC: 14.6 G/DL — SIGNIFICANT CHANGE UP (ref 13–17)
MCHC RBC-ENTMCNC: 32 PG — SIGNIFICANT CHANGE UP (ref 27–34)
MCHC RBC-ENTMCNC: 34.3 G/DL — SIGNIFICANT CHANGE UP (ref 32–36)
MCV RBC AUTO: 93.4 FL — SIGNIFICANT CHANGE UP (ref 80–100)
NRBC BLD AUTO-RTO: 0 /100 WBCS — SIGNIFICANT CHANGE UP (ref 0–0)
PLATELET # BLD AUTO: 255 K/UL — SIGNIFICANT CHANGE UP (ref 150–400)
POTASSIUM SERPL-MCNC: 4.6 MMOL/L — SIGNIFICANT CHANGE UP (ref 3.5–5.3)
POTASSIUM SERPL-SCNC: 4.6 MMOL/L — SIGNIFICANT CHANGE UP (ref 3.5–5.3)
RBC # BLD: 4.56 M/UL — SIGNIFICANT CHANGE UP (ref 4.2–5.8)
RBC # FLD: 12.1 % — SIGNIFICANT CHANGE UP (ref 10.3–14.5)
SODIUM SERPL-SCNC: 139 MMOL/L — SIGNIFICANT CHANGE UP (ref 135–145)
WBC # BLD: 12.52 K/UL — HIGH (ref 3.8–10.5)
WBC # FLD AUTO: 12.52 K/UL — HIGH (ref 3.8–10.5)

## 2025-06-11 PROCEDURE — 99233 SBSQ HOSP IP/OBS HIGH 50: CPT

## 2025-06-11 RX ORDER — AMLODIPINE BESYLATE 10 MG/1
5 TABLET ORAL DAILY
Refills: 0 | Status: DISCONTINUED | OUTPATIENT
Start: 2025-06-11 | End: 2025-06-14

## 2025-06-11 RX ORDER — DEXAMETHASONE 0.5 MG/1
6 TABLET ORAL EVERY 6 HOURS
Refills: 0 | Status: COMPLETED | OUTPATIENT
Start: 2025-06-11 | End: 2025-06-12

## 2025-06-11 RX ORDER — DEXAMETHASONE 0.5 MG/1
6 TABLET ORAL EVERY 6 HOURS
Refills: 0 | Status: DISCONTINUED | OUTPATIENT
Start: 2025-06-11 | End: 2025-06-11

## 2025-06-11 RX ADMIN — DEXAMETHASONE 6 MILLIGRAM(S): 0.5 TABLET ORAL at 15:16

## 2025-06-11 RX ADMIN — DEXAMETHASONE 6 MILLIGRAM(S): 0.5 TABLET ORAL at 21:38

## 2025-06-11 RX ADMIN — Medication 0.5 MILLIGRAM(S): at 00:34

## 2025-06-11 RX ADMIN — METHOCARBAMOL 500 MILLIGRAM(S): 500 TABLET, FILM COATED ORAL at 13:45

## 2025-06-11 RX ADMIN — Medication 1000 MILLIGRAM(S): at 13:57

## 2025-06-11 RX ADMIN — Medication 1000 MILLIGRAM(S): at 21:39

## 2025-06-11 RX ADMIN — INSULIN LISPRO 2: 100 INJECTION, SOLUTION INTRAVENOUS; SUBCUTANEOUS at 11:41

## 2025-06-11 RX ADMIN — INSULIN LISPRO 2: 100 INJECTION, SOLUTION INTRAVENOUS; SUBCUTANEOUS at 07:15

## 2025-06-11 RX ADMIN — Medication 0.5 MILLIGRAM(S): at 00:04

## 2025-06-11 RX ADMIN — METHOCARBAMOL 500 MILLIGRAM(S): 500 TABLET, FILM COATED ORAL at 05:58

## 2025-06-11 RX ADMIN — AMLODIPINE BESYLATE 10 MILLIGRAM(S): 10 TABLET ORAL at 05:58

## 2025-06-11 RX ADMIN — Medication 2 TABLET(S): at 21:39

## 2025-06-11 RX ADMIN — CEFEPIME 100 MILLIGRAM(S): 2 INJECTION, POWDER, FOR SOLUTION INTRAVENOUS at 02:37

## 2025-06-11 RX ADMIN — OXYCODONE HYDROCHLORIDE 10 MILLIGRAM(S): 30 TABLET ORAL at 09:20

## 2025-06-11 RX ADMIN — ENOXAPARIN SODIUM 40 MILLIGRAM(S): 100 INJECTION SUBCUTANEOUS at 21:38

## 2025-06-11 RX ADMIN — OXYCODONE HYDROCHLORIDE 10 MILLIGRAM(S): 30 TABLET ORAL at 06:05

## 2025-06-11 RX ADMIN — DEXAMETHASONE 6 MILLIGRAM(S): 0.5 TABLET ORAL at 05:58

## 2025-06-11 RX ADMIN — Medication 40 MILLIGRAM(S): at 05:58

## 2025-06-11 RX ADMIN — Medication 1000 MILLIGRAM(S): at 13:45

## 2025-06-11 RX ADMIN — OXYCODONE HYDROCHLORIDE 10 MILLIGRAM(S): 30 TABLET ORAL at 05:05

## 2025-06-11 RX ADMIN — PREGABALIN 50 MILLIGRAM(S): 50 CAPSULE ORAL at 05:58

## 2025-06-11 RX ADMIN — PREGABALIN 50 MILLIGRAM(S): 50 CAPSULE ORAL at 18:49

## 2025-06-11 RX ADMIN — Medication 1000 MILLIGRAM(S): at 22:39

## 2025-06-11 RX ADMIN — METHOCARBAMOL 500 MILLIGRAM(S): 500 TABLET, FILM COATED ORAL at 21:39

## 2025-06-11 RX ADMIN — POLYETHYLENE GLYCOL 3350 17 GRAM(S): 17 POWDER, FOR SOLUTION ORAL at 13:45

## 2025-06-11 RX ADMIN — CEFEPIME 100 MILLIGRAM(S): 2 INJECTION, POWDER, FOR SOLUTION INTRAVENOUS at 10:03

## 2025-06-12 ENCOUNTER — APPOINTMENT (OUTPATIENT)
Dept: RHEUMATOLOGY | Facility: CLINIC | Age: 55
End: 2025-06-12

## 2025-06-12 ENCOUNTER — TRANSCRIPTION ENCOUNTER (OUTPATIENT)
Age: 55
End: 2025-06-12

## 2025-06-12 LAB
ANION GAP SERPL CALC-SCNC: 7 MMOL/L — SIGNIFICANT CHANGE UP (ref 5–17)
BUN SERPL-MCNC: 26 MG/DL — HIGH (ref 7–23)
CALCIUM SERPL-MCNC: 8.4 MG/DL — SIGNIFICANT CHANGE UP (ref 8.4–10.5)
CHLORIDE SERPL-SCNC: 99 MMOL/L — SIGNIFICANT CHANGE UP (ref 96–108)
CO2 SERPL-SCNC: 26 MMOL/L — SIGNIFICANT CHANGE UP (ref 22–31)
CREAT SERPL-MCNC: 0.92 MG/DL — SIGNIFICANT CHANGE UP (ref 0.5–1.3)
EGFR: 97 ML/MIN/1.73M2 — SIGNIFICANT CHANGE UP
EGFR: 97 ML/MIN/1.73M2 — SIGNIFICANT CHANGE UP
GLUCOSE SERPL-MCNC: 145 MG/DL — HIGH (ref 70–99)
HCT VFR BLD CALC: 39.4 % — SIGNIFICANT CHANGE UP (ref 39–50)
HGB BLD-MCNC: 13.9 G/DL — SIGNIFICANT CHANGE UP (ref 13–17)
MCHC RBC-ENTMCNC: 32.2 PG — SIGNIFICANT CHANGE UP (ref 27–34)
MCHC RBC-ENTMCNC: 35.3 G/DL — SIGNIFICANT CHANGE UP (ref 32–36)
MCV RBC AUTO: 91.2 FL — SIGNIFICANT CHANGE UP (ref 80–100)
NRBC BLD AUTO-RTO: 0 /100 WBCS — SIGNIFICANT CHANGE UP (ref 0–0)
OSMOLALITY UR: 941 MOSM/KG — HIGH (ref 300–900)
PLATELET # BLD AUTO: 248 K/UL — SIGNIFICANT CHANGE UP (ref 150–400)
POTASSIUM SERPL-MCNC: 4.8 MMOL/L — SIGNIFICANT CHANGE UP (ref 3.5–5.3)
POTASSIUM SERPL-SCNC: 4.8 MMOL/L — SIGNIFICANT CHANGE UP (ref 3.5–5.3)
RBC # BLD: 4.32 M/UL — SIGNIFICANT CHANGE UP (ref 4.2–5.8)
RBC # FLD: 12.3 % — SIGNIFICANT CHANGE UP (ref 10.3–14.5)
SODIUM SERPL-SCNC: 132 MMOL/L — LOW (ref 135–145)
SODIUM SERPL-SCNC: 132 MMOL/L — LOW (ref 135–145)
SODIUM UR-SCNC: 92 MMOL/L — SIGNIFICANT CHANGE UP
WBC # BLD: 11.59 K/UL — HIGH (ref 3.8–10.5)
WBC # FLD AUTO: 11.59 K/UL — HIGH (ref 3.8–10.5)

## 2025-06-12 PROCEDURE — 99233 SBSQ HOSP IP/OBS HIGH 50: CPT

## 2025-06-12 PROCEDURE — 72100 X-RAY EXAM L-S SPINE 2/3 VWS: CPT | Mod: 26

## 2025-06-12 RX ORDER — BISACODYL 5 MG
10 TABLET, DELAYED RELEASE (ENTERIC COATED) ORAL DAILY
Refills: 0 | Status: DISCONTINUED | OUTPATIENT
Start: 2025-06-12 | End: 2025-06-14

## 2025-06-12 RX ORDER — ACETAMINOPHEN 500 MG/5ML
2 LIQUID (ML) ORAL
Qty: 0 | Refills: 0 | DISCHARGE
Start: 2025-06-12

## 2025-06-12 RX ORDER — PREGABALIN 50 MG/1
1 CAPSULE ORAL
Qty: 14 | Refills: 0
Start: 2025-06-12

## 2025-06-12 RX ORDER — OXYCODONE HYDROCHLORIDE 30 MG/1
1 TABLET ORAL
Qty: 30 | Refills: 0
Start: 2025-06-12

## 2025-06-12 RX ORDER — OXYCODONE HYDROCHLORIDE 30 MG/1
10 TABLET ORAL EVERY 4 HOURS
Refills: 0 | Status: DISCONTINUED | OUTPATIENT
Start: 2025-06-12 | End: 2025-06-14

## 2025-06-12 RX ORDER — MAGNESIUM HYDROXIDE 400 MG/5ML
30 SUSPENSION ORAL DAILY
Refills: 0 | Status: DISCONTINUED | OUTPATIENT
Start: 2025-06-12 | End: 2025-06-14

## 2025-06-12 RX ORDER — AMLODIPINE BESYLATE 10 MG/1
1 TABLET ORAL
Qty: 0 | Refills: 0 | DISCHARGE

## 2025-06-12 RX ORDER — METHOCARBAMOL 500 MG/1
1 TABLET, FILM COATED ORAL
Qty: 15 | Refills: 0
Start: 2025-06-12

## 2025-06-12 RX ORDER — POLYETHYLENE GLYCOL 3350 17 G/17G
17 POWDER, FOR SOLUTION ORAL
Qty: 0 | Refills: 0 | DISCHARGE
Start: 2025-06-12

## 2025-06-12 RX ADMIN — ENOXAPARIN SODIUM 40 MILLIGRAM(S): 100 INJECTION SUBCUTANEOUS at 21:44

## 2025-06-12 RX ADMIN — METHOCARBAMOL 500 MILLIGRAM(S): 500 TABLET, FILM COATED ORAL at 21:43

## 2025-06-12 RX ADMIN — OXYCODONE HYDROCHLORIDE 5 MILLIGRAM(S): 30 TABLET ORAL at 12:16

## 2025-06-12 RX ADMIN — PREGABALIN 50 MILLIGRAM(S): 50 CAPSULE ORAL at 06:01

## 2025-06-12 RX ADMIN — POLYETHYLENE GLYCOL 3350 17 GRAM(S): 17 POWDER, FOR SOLUTION ORAL at 08:57

## 2025-06-12 RX ADMIN — Medication 40 MILLIGRAM(S): at 06:01

## 2025-06-12 RX ADMIN — Medication 1000 MILLIGRAM(S): at 06:00

## 2025-06-12 RX ADMIN — AMLODIPINE BESYLATE 5 MILLIGRAM(S): 10 TABLET ORAL at 06:01

## 2025-06-12 RX ADMIN — DEXAMETHASONE 6 MILLIGRAM(S): 0.5 TABLET ORAL at 03:08

## 2025-06-12 RX ADMIN — INSULIN LISPRO 2: 100 INJECTION, SOLUTION INTRAVENOUS; SUBCUTANEOUS at 17:52

## 2025-06-12 RX ADMIN — MAGNESIUM HYDROXIDE 30 MILLILITER(S): 400 SUSPENSION ORAL at 15:38

## 2025-06-12 RX ADMIN — DEXAMETHASONE 6 MILLIGRAM(S): 0.5 TABLET ORAL at 08:57

## 2025-06-12 RX ADMIN — Medication 1000 MILLIGRAM(S): at 21:43

## 2025-06-12 RX ADMIN — INSULIN LISPRO 2: 100 INJECTION, SOLUTION INTRAVENOUS; SUBCUTANEOUS at 12:15

## 2025-06-12 RX ADMIN — Medication 1000 MILLIGRAM(S): at 07:00

## 2025-06-12 RX ADMIN — OXYCODONE HYDROCHLORIDE 5 MILLIGRAM(S): 30 TABLET ORAL at 03:04

## 2025-06-12 RX ADMIN — OXYCODONE HYDROCHLORIDE 5 MILLIGRAM(S): 30 TABLET ORAL at 20:57

## 2025-06-12 RX ADMIN — METHOCARBAMOL 500 MILLIGRAM(S): 500 TABLET, FILM COATED ORAL at 13:18

## 2025-06-12 RX ADMIN — METHOCARBAMOL 500 MILLIGRAM(S): 500 TABLET, FILM COATED ORAL at 06:01

## 2025-06-12 RX ADMIN — OXYCODONE HYDROCHLORIDE 5 MILLIGRAM(S): 30 TABLET ORAL at 02:04

## 2025-06-12 RX ADMIN — DEXAMETHASONE 6 MILLIGRAM(S): 0.5 TABLET ORAL at 15:39

## 2025-06-12 RX ADMIN — Medication 1000 MILLIGRAM(S): at 13:18

## 2025-06-12 RX ADMIN — OXYCODONE HYDROCHLORIDE 5 MILLIGRAM(S): 30 TABLET ORAL at 13:16

## 2025-06-12 RX ADMIN — PREGABALIN 50 MILLIGRAM(S): 50 CAPSULE ORAL at 17:53

## 2025-06-12 RX ADMIN — OXYCODONE HYDROCHLORIDE 5 MILLIGRAM(S): 30 TABLET ORAL at 21:57

## 2025-06-12 RX ADMIN — Medication 2 TABLET(S): at 21:42

## 2025-06-13 ENCOUNTER — APPOINTMENT (OUTPATIENT)
Dept: PAIN MANAGEMENT | Facility: CLINIC | Age: 55
End: 2025-06-13

## 2025-06-13 ENCOUNTER — TRANSCRIPTION ENCOUNTER (OUTPATIENT)
Age: 55
End: 2025-06-13

## 2025-06-13 LAB
ANION GAP SERPL CALC-SCNC: 10 MMOL/L — SIGNIFICANT CHANGE UP (ref 5–17)
BUN SERPL-MCNC: 25 MG/DL — HIGH (ref 7–23)
CALCIUM SERPL-MCNC: 8.7 MG/DL — SIGNIFICANT CHANGE UP (ref 8.4–10.5)
CHLORIDE SERPL-SCNC: 98 MMOL/L — SIGNIFICANT CHANGE UP (ref 96–108)
CO2 SERPL-SCNC: 27 MMOL/L — SIGNIFICANT CHANGE UP (ref 22–31)
CREAT SERPL-MCNC: 0.86 MG/DL — SIGNIFICANT CHANGE UP (ref 0.5–1.3)
EGFR: 101 ML/MIN/1.73M2 — SIGNIFICANT CHANGE UP
EGFR: 101 ML/MIN/1.73M2 — SIGNIFICANT CHANGE UP
GLUCOSE SERPL-MCNC: 116 MG/DL — HIGH (ref 70–99)
HCT VFR BLD CALC: 39.4 % — SIGNIFICANT CHANGE UP (ref 39–50)
HGB BLD-MCNC: 13.6 G/DL — SIGNIFICANT CHANGE UP (ref 13–17)
MCHC RBC-ENTMCNC: 31.7 PG — SIGNIFICANT CHANGE UP (ref 27–34)
MCHC RBC-ENTMCNC: 34.5 G/DL — SIGNIFICANT CHANGE UP (ref 32–36)
MCV RBC AUTO: 91.8 FL — SIGNIFICANT CHANGE UP (ref 80–100)
NRBC BLD AUTO-RTO: 0 /100 WBCS — SIGNIFICANT CHANGE UP (ref 0–0)
PLATELET # BLD AUTO: 227 K/UL — SIGNIFICANT CHANGE UP (ref 150–400)
POTASSIUM SERPL-MCNC: 4.5 MMOL/L — SIGNIFICANT CHANGE UP (ref 3.5–5.3)
POTASSIUM SERPL-SCNC: 4.5 MMOL/L — SIGNIFICANT CHANGE UP (ref 3.5–5.3)
RBC # BLD: 4.29 M/UL — SIGNIFICANT CHANGE UP (ref 4.2–5.8)
RBC # FLD: 12.3 % — SIGNIFICANT CHANGE UP (ref 10.3–14.5)
SODIUM SERPL-SCNC: 135 MMOL/L — SIGNIFICANT CHANGE UP (ref 135–145)
WBC # BLD: 10.23 K/UL — SIGNIFICANT CHANGE UP (ref 3.8–10.5)
WBC # FLD AUTO: 10.23 K/UL — SIGNIFICANT CHANGE UP (ref 3.8–10.5)

## 2025-06-13 PROCEDURE — 99233 SBSQ HOSP IP/OBS HIGH 50: CPT

## 2025-06-13 RX ADMIN — OXYCODONE HYDROCHLORIDE 5 MILLIGRAM(S): 30 TABLET ORAL at 14:30

## 2025-06-13 RX ADMIN — PREGABALIN 50 MILLIGRAM(S): 50 CAPSULE ORAL at 05:52

## 2025-06-13 RX ADMIN — Medication 1000 MILLIGRAM(S): at 05:52

## 2025-06-13 RX ADMIN — ENOXAPARIN SODIUM 40 MILLIGRAM(S): 100 INJECTION SUBCUTANEOUS at 21:48

## 2025-06-13 RX ADMIN — METHOCARBAMOL 500 MILLIGRAM(S): 500 TABLET, FILM COATED ORAL at 13:29

## 2025-06-13 RX ADMIN — OXYCODONE HYDROCHLORIDE 5 MILLIGRAM(S): 30 TABLET ORAL at 13:30

## 2025-06-13 RX ADMIN — METHOCARBAMOL 500 MILLIGRAM(S): 500 TABLET, FILM COATED ORAL at 21:48

## 2025-06-13 RX ADMIN — METHOCARBAMOL 500 MILLIGRAM(S): 500 TABLET, FILM COATED ORAL at 05:51

## 2025-06-13 RX ADMIN — OXYCODONE HYDROCHLORIDE 10 MILLIGRAM(S): 30 TABLET ORAL at 23:22

## 2025-06-13 RX ADMIN — Medication 40 MILLIGRAM(S): at 05:51

## 2025-06-13 RX ADMIN — Medication 1000 MILLIGRAM(S): at 13:29

## 2025-06-13 RX ADMIN — PREGABALIN 50 MILLIGRAM(S): 50 CAPSULE ORAL at 18:22

## 2025-06-13 RX ADMIN — Medication 1000 MILLIGRAM(S): at 21:48

## 2025-06-13 RX ADMIN — POLYETHYLENE GLYCOL 3350 17 GRAM(S): 17 POWDER, FOR SOLUTION ORAL at 13:31

## 2025-06-13 RX ADMIN — Medication 2 TABLET(S): at 21:48

## 2025-06-14 VITALS
HEART RATE: 69 BPM | SYSTOLIC BLOOD PRESSURE: 125 MMHG | DIASTOLIC BLOOD PRESSURE: 88 MMHG | RESPIRATION RATE: 16 BRPM | TEMPERATURE: 98 F | OXYGEN SATURATION: 97 %

## 2025-06-14 PROCEDURE — 76000 FLUOROSCOPY <1 HR PHYS/QHP: CPT

## 2025-06-14 PROCEDURE — 80048 BASIC METABOLIC PNL TOTAL CA: CPT

## 2025-06-14 PROCEDURE — 86900 BLOOD TYPING SEROLOGIC ABO: CPT

## 2025-06-14 PROCEDURE — 93005 ELECTROCARDIOGRAM TRACING: CPT

## 2025-06-14 PROCEDURE — 72100 X-RAY EXAM L-S SPINE 2/3 VWS: CPT

## 2025-06-14 PROCEDURE — 81001 URINALYSIS AUTO W/SCOPE: CPT

## 2025-06-14 PROCEDURE — 85730 THROMBOPLASTIN TIME PARTIAL: CPT

## 2025-06-14 PROCEDURE — 97161 PT EVAL LOW COMPLEX 20 MIN: CPT

## 2025-06-14 PROCEDURE — 85027 COMPLETE CBC AUTOMATED: CPT

## 2025-06-14 PROCEDURE — 84295 ASSAY OF SERUM SODIUM: CPT

## 2025-06-14 PROCEDURE — C1889: CPT

## 2025-06-14 PROCEDURE — 86901 BLOOD TYPING SEROLOGIC RH(D): CPT

## 2025-06-14 PROCEDURE — C1713: CPT

## 2025-06-14 PROCEDURE — 85610 PROTHROMBIN TIME: CPT

## 2025-06-14 PROCEDURE — 97116 GAIT TRAINING THERAPY: CPT

## 2025-06-14 PROCEDURE — 97164 PT RE-EVAL EST PLAN CARE: CPT

## 2025-06-14 PROCEDURE — 84300 ASSAY OF URINE SODIUM: CPT

## 2025-06-14 PROCEDURE — 83036 HEMOGLOBIN GLYCOSYLATED A1C: CPT

## 2025-06-14 PROCEDURE — 82962 GLUCOSE BLOOD TEST: CPT

## 2025-06-14 PROCEDURE — 72148 MRI LUMBAR SPINE W/O DYE: CPT

## 2025-06-14 PROCEDURE — 36415 COLL VENOUS BLD VENIPUNCTURE: CPT

## 2025-06-14 PROCEDURE — 83935 ASSAY OF URINE OSMOLALITY: CPT

## 2025-06-14 PROCEDURE — 71045 X-RAY EXAM CHEST 1 VIEW: CPT

## 2025-06-14 PROCEDURE — 86850 RBC ANTIBODY SCREEN: CPT

## 2025-06-14 RX ADMIN — Medication 1000 MILLIGRAM(S): at 13:09

## 2025-06-14 RX ADMIN — OXYCODONE HYDROCHLORIDE 10 MILLIGRAM(S): 30 TABLET ORAL at 14:09

## 2025-06-14 RX ADMIN — AMLODIPINE BESYLATE 5 MILLIGRAM(S): 10 TABLET ORAL at 05:46

## 2025-06-14 RX ADMIN — POLYETHYLENE GLYCOL 3350 17 GRAM(S): 17 POWDER, FOR SOLUTION ORAL at 12:10

## 2025-06-14 RX ADMIN — METHOCARBAMOL 500 MILLIGRAM(S): 500 TABLET, FILM COATED ORAL at 05:46

## 2025-06-14 RX ADMIN — Medication 1000 MILLIGRAM(S): at 05:46

## 2025-06-14 RX ADMIN — OXYCODONE HYDROCHLORIDE 10 MILLIGRAM(S): 30 TABLET ORAL at 13:09

## 2025-06-14 RX ADMIN — OXYCODONE HYDROCHLORIDE 10 MILLIGRAM(S): 30 TABLET ORAL at 00:22

## 2025-06-14 RX ADMIN — Medication 40 MILLIGRAM(S): at 05:46

## 2025-06-14 RX ADMIN — PREGABALIN 50 MILLIGRAM(S): 50 CAPSULE ORAL at 05:46

## 2025-06-14 RX ADMIN — METHOCARBAMOL 500 MILLIGRAM(S): 500 TABLET, FILM COATED ORAL at 13:09

## 2025-06-17 DIAGNOSIS — T38.0X5A ADVERSE EFFECT OF GLUCOCORTICOIDS AND SYNTHETIC ANALOGUES, INITIAL ENCOUNTER: ICD-10-CM

## 2025-06-17 DIAGNOSIS — E66.811 OBESITY, CLASS 1: ICD-10-CM

## 2025-06-17 DIAGNOSIS — I10 ESSENTIAL (PRIMARY) HYPERTENSION: ICD-10-CM

## 2025-06-17 DIAGNOSIS — M48.061 SPINAL STENOSIS, LUMBAR REGION WITHOUT NEUROGENIC CLAUDICATION: ICD-10-CM

## 2025-06-17 DIAGNOSIS — Z87.891 PERSONAL HISTORY OF NICOTINE DEPENDENCE: ICD-10-CM

## 2025-06-17 DIAGNOSIS — D72.829 ELEVATED WHITE BLOOD CELL COUNT, UNSPECIFIED: ICD-10-CM

## 2025-06-17 DIAGNOSIS — R73.9 HYPERGLYCEMIA, UNSPECIFIED: ICD-10-CM

## 2025-06-17 DIAGNOSIS — E87.1 HYPO-OSMOLALITY AND HYPONATREMIA: ICD-10-CM

## 2025-06-17 DIAGNOSIS — M51.16 INTERVERTEBRAL DISC DISORDERS WITH RADICULOPATHY, LUMBAR REGION: ICD-10-CM

## 2025-06-20 ENCOUNTER — NON-APPOINTMENT (OUTPATIENT)
Age: 55
End: 2025-06-20

## 2025-06-23 RX ORDER — PREGABALIN 50 MG/1
50 CAPSULE ORAL TWICE DAILY
Qty: 60 | Refills: 0 | Status: ACTIVE | COMMUNITY
Start: 2025-06-23 | End: 1900-01-01

## 2025-06-30 ENCOUNTER — APPOINTMENT (OUTPATIENT)
Dept: ORTHOPEDIC SURGERY | Facility: CLINIC | Age: 55
End: 2025-06-30
Payer: COMMERCIAL

## 2025-06-30 ENCOUNTER — TRANSCRIPTION ENCOUNTER (OUTPATIENT)
Age: 55
End: 2025-06-30

## 2025-06-30 PROCEDURE — 72100 X-RAY EXAM L-S SPINE 2/3 VWS: CPT

## 2025-06-30 PROCEDURE — 99024 POSTOP FOLLOW-UP VISIT: CPT

## 2025-06-30 RX ORDER — OXYCODONE 5 MG/1
5 TABLET ORAL EVERY 6 HOURS
Qty: 28 | Refills: 0 | Status: ACTIVE | COMMUNITY
Start: 2025-06-30 | End: 1900-01-01

## 2025-07-15 ENCOUNTER — NON-APPOINTMENT (OUTPATIENT)
Age: 55
End: 2025-07-15

## 2025-07-22 ENCOUNTER — APPOINTMENT (OUTPATIENT)
Dept: ORTHOPEDIC SURGERY | Facility: CLINIC | Age: 55
End: 2025-07-22
Payer: COMMERCIAL

## 2025-07-22 DIAGNOSIS — Z98.1 ARTHRODESIS STATUS: ICD-10-CM

## 2025-07-22 PROCEDURE — 72100 X-RAY EXAM L-S SPINE 2/3 VWS: CPT

## 2025-07-22 PROCEDURE — 99024 POSTOP FOLLOW-UP VISIT: CPT

## 2025-07-22 RX ORDER — PREGABALIN 50 MG/1
50 CAPSULE ORAL 3 TIMES DAILY
Qty: 90 | Refills: 0 | Status: ACTIVE | COMMUNITY
Start: 2025-07-22 | End: 1900-01-01

## 2025-07-22 RX ORDER — OXYCODONE 5 MG/1
5 TABLET ORAL 3 TIMES DAILY
Qty: 21 | Refills: 0 | Status: ACTIVE | COMMUNITY
Start: 2025-07-22 | End: 1900-01-01

## 2025-08-12 ENCOUNTER — APPOINTMENT (OUTPATIENT)
Dept: HEART AND VASCULAR | Facility: CLINIC | Age: 55
End: 2025-08-12
Payer: COMMERCIAL

## 2025-08-12 VITALS — SYSTOLIC BLOOD PRESSURE: 142 MMHG | DIASTOLIC BLOOD PRESSURE: 84 MMHG

## 2025-08-12 VITALS
DIASTOLIC BLOOD PRESSURE: 86 MMHG | OXYGEN SATURATION: 97 % | TEMPERATURE: 98.3 F | HEIGHT: 68 IN | SYSTOLIC BLOOD PRESSURE: 122 MMHG | WEIGHT: 211 LBS | HEART RATE: 71 BPM | BODY MASS INDEX: 31.98 KG/M2

## 2025-08-12 DIAGNOSIS — I10 ESSENTIAL (PRIMARY) HYPERTENSION: ICD-10-CM

## 2025-08-12 DIAGNOSIS — E78.00 PURE HYPERCHOLESTEROLEMIA, UNSPECIFIED: ICD-10-CM

## 2025-08-12 DIAGNOSIS — R73.09 OTHER ABNORMAL GLUCOSE: ICD-10-CM

## 2025-08-12 PROCEDURE — 99213 OFFICE O/P EST LOW 20 MIN: CPT

## 2025-08-12 RX ORDER — METHOCARBAMOL 500 MG/1
500 TABLET, FILM COATED ORAL
Refills: 0 | Status: ACTIVE | COMMUNITY

## 2025-08-12 RX ORDER — ACETAMINOPHEN 500 MG/1
500 TABLET ORAL EVERY 8 HOURS
Refills: 0 | Status: ACTIVE | COMMUNITY

## 2025-08-22 ENCOUNTER — NON-APPOINTMENT (OUTPATIENT)
Age: 55
End: 2025-08-22

## 2025-08-22 DIAGNOSIS — M51.369: ICD-10-CM

## 2025-08-22 DIAGNOSIS — Z98.1 ARTHRODESIS STATUS: ICD-10-CM

## 2025-08-22 RX ORDER — OXYCODONE 5 MG/1
5 TABLET ORAL
Qty: 14 | Refills: 0 | Status: ACTIVE | COMMUNITY
Start: 2025-08-22 | End: 1900-01-01

## 2025-08-22 RX ORDER — PREGABALIN 50 MG/1
50 CAPSULE ORAL 3 TIMES DAILY
Qty: 90 | Refills: 0 | Status: ACTIVE | COMMUNITY
Start: 2025-08-22 | End: 1900-01-01

## 2025-09-02 ENCOUNTER — APPOINTMENT (OUTPATIENT)
Dept: ORTHOPEDIC SURGERY | Facility: CLINIC | Age: 55
End: 2025-09-02
Payer: COMMERCIAL

## 2025-09-02 VITALS
TEMPERATURE: 98.4 F | OXYGEN SATURATION: 96 % | SYSTOLIC BLOOD PRESSURE: 134 MMHG | WEIGHT: 211 LBS | DIASTOLIC BLOOD PRESSURE: 95 MMHG | BODY MASS INDEX: 31.98 KG/M2 | HEART RATE: 76 BPM | HEIGHT: 68 IN

## 2025-09-02 DIAGNOSIS — Z98.1 ARTHRODESIS STATUS: ICD-10-CM

## 2025-09-02 DIAGNOSIS — M54.16 RADICULOPATHY, LUMBAR REGION: ICD-10-CM

## 2025-09-02 PROCEDURE — 99024 POSTOP FOLLOW-UP VISIT: CPT

## (undated) DEVICE — DRAPE SURGICAL #1010

## (undated) DEVICE — BOSTON SCIENTIFIC UROLOK II SCOPE ADAPTOR

## (undated) DEVICE — BUR STRYKER MATCH HEAD NEURO SOFT TOUCH 3MM

## (undated) DEVICE — ELCTR STRYKER NEPTUNE SMOKE EVACUATION PENCIL (GREEN)

## (undated) DEVICE — SUT SILK 0 30" TIES

## (undated) DEVICE — SUT PROLENE 5-0 36" RB-1

## (undated) DEVICE — SLV COMPRESSION KNEE MED

## (undated) DEVICE — Device

## (undated) DEVICE — DRSG DERMABOND PRINEO 22CM

## (undated) DEVICE — SUT PDS II 1 96" XLH

## (undated) DEVICE — TUBING RANGER FLUID IRRIGATION SET DISP

## (undated) DEVICE — SUT VICRYL 2-0 27" CT-1

## (undated) DEVICE — PREP BETADINE KIT

## (undated) DEVICE — ELCTR AQUAMANTYS BIPOLAR SEALER 6.0

## (undated) DEVICE — DRAPE INSTRUMENT POUCH 6.75" X 11"

## (undated) DEVICE — SUT SILK 2-0 30" PSL

## (undated) DEVICE — MIDAS REX MR8 MATCH HEAD FLUTED LG BORE 3MM X 14CM

## (undated) DEVICE — MARKING PEN W RULER

## (undated) DEVICE — DRAPE C ARM 41X74"

## (undated) DEVICE — GLV 7.5 PROTEXIS (WHITE)

## (undated) DEVICE — PREP CHLORAPREP HI-LITE ORANGE 26ML

## (undated) DEVICE — NEURO SURGICAL STRIP 1/4 X 6"

## (undated) DEVICE — DRAPE TOWEL BLUE 17" X 24"

## (undated) DEVICE — SUCTION YANKAUER OPEN TIP NO VENT CURVE

## (undated) DEVICE — DRSG BIOPATCH DISK W CHG 1" W 4.0MM HOLE

## (undated) DEVICE — DRSG TELFA 3 X 8

## (undated) DEVICE — POSITIONER JACKSON TABLE XODUS

## (undated) DEVICE — DRAPE MICROSCOPE LEICA MINI

## (undated) DEVICE — PACK SPINE

## (undated) DEVICE — NDL SPINAL 18G X 3.5" (PINK)

## (undated) DEVICE — NDL IMBIBE

## (undated) DEVICE — LAP PAD 18 X 18"

## (undated) DEVICE — MIDAS REX MR8 BALL FLUTED LG BORE 5MM X 14CM

## (undated) DEVICE — DRSG MASTISOL

## (undated) DEVICE — SUT VICRYL 3-0 27" SH

## (undated) DEVICE — PREP DURAPREP 26CC

## (undated) DEVICE — ELCTR BOVIE PENCIL BLADE 10FT

## (undated) DEVICE — SUT PROLENE 4-0 36" SH

## (undated) DEVICE — SUT VICRYL PLUS 2-0 18" CT-2 (POP-OFF)

## (undated) DEVICE — DRAPE 3/4 SHEET 52X76"

## (undated) DEVICE — DRAPE GENERAL ENDOSCOPY

## (undated) DEVICE — POSITIONER FOAM EGG CRATE ULNAR 2PCS (PINK)

## (undated) DEVICE — SUT PROLENE 6-0 30" BV

## (undated) DEVICE — DRAIN JACKSON PRATT 3 SPRING RESERVOIR W 10FR PVC DRAIN

## (undated) DEVICE — SOL IRR BAG NS 0.9% 3000ML

## (undated) DEVICE — NDL HYPO SAFE 22G X 1.5" (BLACK)

## (undated) DEVICE — VENODYNE/SCD SLEEVE CALF MEDIUM

## (undated) DEVICE — NEURO SURGICAL STRIP 1/2 X 6"

## (undated) DEVICE — SUT SILK 2-0 12-18"

## (undated) DEVICE — DRAPE LIGHT HANDLE COVER (GREEN)

## (undated) DEVICE — SUT NYLON 3-0 18" PS-2

## (undated) DEVICE — SUT MONOCRYL 3-0 27" PS-2 UNDYED

## (undated) DEVICE — DRSG TEGADERM 4 X 4.75"

## (undated) DEVICE — SUT VICRYL 0 18" CT-2 (POP-OFF)

## (undated) DEVICE — PACK CYSTO